# Patient Record
Sex: FEMALE | Race: WHITE | NOT HISPANIC OR LATINO | URBAN - METROPOLITAN AREA
[De-identification: names, ages, dates, MRNs, and addresses within clinical notes are randomized per-mention and may not be internally consistent; named-entity substitution may affect disease eponyms.]

---

## 2017-01-10 ENCOUNTER — OFFICE (AMBULATORY)
Dept: URBAN - METROPOLITAN AREA CLINIC 64 | Facility: CLINIC | Age: 52
End: 2017-01-10

## 2017-01-10 VITALS
HEART RATE: 69 BPM | WEIGHT: 217 LBS | HEIGHT: 64 IN | DIASTOLIC BLOOD PRESSURE: 69 MMHG | SYSTOLIC BLOOD PRESSURE: 116 MMHG

## 2017-01-10 DIAGNOSIS — R10.13 EPIGASTRIC PAIN: ICD-10-CM

## 2017-01-10 PROCEDURE — 99213 OFFICE O/P EST LOW 20 MIN: CPT

## 2017-01-10 RX ORDER — OMEPRAZOLE 40 MG/1
CAPSULE, DELAYED RELEASE PELLETS ORAL
Qty: 30 | Refills: 11 | Status: ACTIVE

## 2017-01-19 ENCOUNTER — ON CAMPUS - OUTPATIENT (AMBULATORY)
Dept: URBAN - METROPOLITAN AREA HOSPITAL 2 | Facility: HOSPITAL | Age: 52
End: 2017-01-19

## 2017-01-19 ENCOUNTER — OFFICE (AMBULATORY)
Dept: URBAN - METROPOLITAN AREA CLINIC 64 | Facility: CLINIC | Age: 52
End: 2017-01-19

## 2017-01-19 VITALS
DIASTOLIC BLOOD PRESSURE: 71 MMHG | HEART RATE: 69 BPM | OXYGEN SATURATION: 95 % | HEART RATE: 68 BPM | HEART RATE: 72 BPM | DIASTOLIC BLOOD PRESSURE: 53 MMHG | RESPIRATION RATE: 17 BRPM | RESPIRATION RATE: 15 BRPM | DIASTOLIC BLOOD PRESSURE: 64 MMHG | RESPIRATION RATE: 19 BRPM | OXYGEN SATURATION: 94 % | SYSTOLIC BLOOD PRESSURE: 106 MMHG | TEMPERATURE: 97.6 F | RESPIRATION RATE: 16 BRPM | OXYGEN SATURATION: 99 % | DIASTOLIC BLOOD PRESSURE: 72 MMHG | OXYGEN SATURATION: 96 % | DIASTOLIC BLOOD PRESSURE: 62 MMHG | DIASTOLIC BLOOD PRESSURE: 67 MMHG | HEART RATE: 70 BPM | SYSTOLIC BLOOD PRESSURE: 119 MMHG | WEIGHT: 209.6 LBS | SYSTOLIC BLOOD PRESSURE: 115 MMHG | SYSTOLIC BLOOD PRESSURE: 116 MMHG | HEIGHT: 64 IN | SYSTOLIC BLOOD PRESSURE: 102 MMHG | SYSTOLIC BLOOD PRESSURE: 101 MMHG | HEART RATE: 66 BPM

## 2017-01-19 DIAGNOSIS — K44.9 DIAPHRAGMATIC HERNIA WITHOUT OBSTRUCTION OR GANGRENE: ICD-10-CM

## 2017-01-19 DIAGNOSIS — R10.13 EPIGASTRIC PAIN: ICD-10-CM

## 2017-01-19 DIAGNOSIS — K29.70 GASTRITIS, UNSPECIFIED, WITHOUT BLEEDING: ICD-10-CM

## 2017-01-19 LAB
GI HISTOLOGY: A. SELECT: (no result)
GI HISTOLOGY: PDF REPORT: (no result)

## 2017-01-19 PROCEDURE — 88305 TISSUE EXAM BY PATHOLOGIST: CPT

## 2017-01-19 PROCEDURE — 43450 DILATE ESOPHAGUS 1/MULT PASS: CPT

## 2017-01-19 PROCEDURE — 43239 EGD BIOPSY SINGLE/MULTIPLE: CPT

## 2017-01-19 RX ADMIN — PROPOFOL: 10 INJECTION, EMULSION INTRAVENOUS at 07:31

## 2018-01-04 ENCOUNTER — OFFICE (AMBULATORY)
Dept: URBAN - METROPOLITAN AREA CLINIC 64 | Facility: CLINIC | Age: 53
End: 2018-01-04

## 2018-01-04 VITALS
HEIGHT: 64 IN | WEIGHT: 224 LBS | SYSTOLIC BLOOD PRESSURE: 132 MMHG | HEART RATE: 64 BPM | DIASTOLIC BLOOD PRESSURE: 77 MMHG

## 2018-01-04 DIAGNOSIS — R11.2 NAUSEA WITH VOMITING, UNSPECIFIED: ICD-10-CM

## 2018-01-04 DIAGNOSIS — R10.13 EPIGASTRIC PAIN: ICD-10-CM

## 2018-01-04 PROCEDURE — 99214 OFFICE O/P EST MOD 30 MIN: CPT

## 2018-12-04 ENCOUNTER — OFFICE (AMBULATORY)
Dept: URBAN - METROPOLITAN AREA PATHOLOGY 4 | Facility: PATHOLOGY | Age: 53
End: 2018-12-04
Payer: COMMERCIAL

## 2018-12-04 ENCOUNTER — HOSPITAL ENCOUNTER (OUTPATIENT)
Dept: OTHER | Facility: HOSPITAL | Age: 53
Setting detail: SPECIMEN
Discharge: HOME | End: 2018-12-04
Attending: INTERNAL MEDICINE | Admitting: INTERNAL MEDICINE

## 2018-12-04 ENCOUNTER — ON CAMPUS - OUTPATIENT (AMBULATORY)
Dept: URBAN - METROPOLITAN AREA HOSPITAL 2 | Facility: HOSPITAL | Age: 53
End: 2018-12-04
Payer: COMMERCIAL

## 2018-12-04 VITALS
DIASTOLIC BLOOD PRESSURE: 82 MMHG | OXYGEN SATURATION: 95 % | OXYGEN SATURATION: 99 % | SYSTOLIC BLOOD PRESSURE: 101 MMHG | DIASTOLIC BLOOD PRESSURE: 70 MMHG | DIASTOLIC BLOOD PRESSURE: 72 MMHG | HEART RATE: 60 BPM | RESPIRATION RATE: 18 BRPM | OXYGEN SATURATION: 96 % | DIASTOLIC BLOOD PRESSURE: 79 MMHG | HEART RATE: 63 BPM | DIASTOLIC BLOOD PRESSURE: 66 MMHG | DIASTOLIC BLOOD PRESSURE: 80 MMHG | SYSTOLIC BLOOD PRESSURE: 126 MMHG | WEIGHT: 224 LBS | HEIGHT: 64 IN | RESPIRATION RATE: 20 BRPM | HEART RATE: 67 BPM | SYSTOLIC BLOOD PRESSURE: 108 MMHG | HEART RATE: 62 BPM | OXYGEN SATURATION: 98 % | SYSTOLIC BLOOD PRESSURE: 128 MMHG | DIASTOLIC BLOOD PRESSURE: 63 MMHG | HEART RATE: 66 BPM | DIASTOLIC BLOOD PRESSURE: 102 MMHG | OXYGEN SATURATION: 97 % | DIASTOLIC BLOOD PRESSURE: 58 MMHG | SYSTOLIC BLOOD PRESSURE: 110 MMHG | SYSTOLIC BLOOD PRESSURE: 123 MMHG | HEART RATE: 61 BPM | RESPIRATION RATE: 16 BRPM | SYSTOLIC BLOOD PRESSURE: 102 MMHG | SYSTOLIC BLOOD PRESSURE: 109 MMHG | DIASTOLIC BLOOD PRESSURE: 71 MMHG | SYSTOLIC BLOOD PRESSURE: 100 MMHG | TEMPERATURE: 97.2 F | SYSTOLIC BLOOD PRESSURE: 150 MMHG

## 2018-12-04 DIAGNOSIS — K62.1 RECTAL POLYP: ICD-10-CM

## 2018-12-04 DIAGNOSIS — K63.5 POLYP OF COLON: ICD-10-CM

## 2018-12-04 DIAGNOSIS — Z86.010 PERSONAL HISTORY OF COLONIC POLYPS: ICD-10-CM

## 2018-12-04 DIAGNOSIS — D12.4 BENIGN NEOPLASM OF DESCENDING COLON: ICD-10-CM

## 2018-12-04 DIAGNOSIS — D12.5 BENIGN NEOPLASM OF SIGMOID COLON: ICD-10-CM

## 2018-12-04 DIAGNOSIS — D12.3 BENIGN NEOPLASM OF TRANSVERSE COLON: ICD-10-CM

## 2018-12-04 LAB
GI HISTOLOGY: A. UNSPECIFIED: (no result)
GI HISTOLOGY: B. UNSPECIFIED: (no result)
GI HISTOLOGY: C. UNSPECIFIED: (no result)
GI HISTOLOGY: D. UNSPECIFIED: (no result)
GI HISTOLOGY: PDF REPORT: (no result)

## 2018-12-04 PROCEDURE — 45385 COLONOSCOPY W/LESION REMOVAL: CPT | Mod: 33 | Performed by: INTERNAL MEDICINE

## 2018-12-04 PROCEDURE — 88305 TISSUE EXAM BY PATHOLOGIST: CPT | Mod: 26 | Performed by: INTERNAL MEDICINE

## 2020-10-19 ENCOUNTER — HOSPITAL ENCOUNTER (OUTPATIENT)
Dept: CARDIOLOGY | Facility: HOSPITAL | Age: 55
Discharge: HOME | End: 2020-10-19

## 2020-10-19 ENCOUNTER — TRANSCRIBE ORDERS (OUTPATIENT)
Dept: ADMINISTRATIVE | Facility: HOSPITAL | Age: 55
End: 2020-10-19

## 2020-10-19 ENCOUNTER — LAB (OUTPATIENT)
Dept: LAB | Facility: HOSPITAL | Age: 55
End: 2020-10-19

## 2020-10-19 DIAGNOSIS — M20.12 ACQUIRED HALLUX VALGUS OF LEFT FOOT: ICD-10-CM

## 2020-10-19 DIAGNOSIS — M20.12 ACQUIRED HALLUX VALGUS OF LEFT FOOT: Primary | ICD-10-CM

## 2020-10-19 LAB
ANION GAP SERPL CALCULATED.3IONS-SCNC: 10.6 MMOL/L (ref 5–15)
BASOPHILS # BLD AUTO: 0.07 10*3/MM3 (ref 0–0.2)
BASOPHILS NFR BLD AUTO: 0.7 % (ref 0–1.5)
BUN SERPL-MCNC: 10 MG/DL (ref 6–20)
BUN/CREAT SERPL: 18.2 (ref 7–25)
CALCIUM SPEC-SCNC: 9.9 MG/DL (ref 8.6–10.5)
CHLORIDE SERPL-SCNC: 101 MMOL/L (ref 98–107)
CO2 SERPL-SCNC: 27.4 MMOL/L (ref 22–29)
CREAT SERPL-MCNC: 0.55 MG/DL (ref 0.57–1)
DEPRECATED RDW RBC AUTO: 42.2 FL (ref 37–54)
EOSINOPHIL # BLD AUTO: 0.12 10*3/MM3 (ref 0–0.4)
EOSINOPHIL NFR BLD AUTO: 1.3 % (ref 0.3–6.2)
ERYTHROCYTE [DISTWIDTH] IN BLOOD BY AUTOMATED COUNT: 12.8 % (ref 12.3–15.4)
GFR SERPL CREATININE-BSD FRML MDRD: 116 ML/MIN/1.73
GLUCOSE SERPL-MCNC: 133 MG/DL (ref 65–99)
HCT VFR BLD AUTO: 46.5 % (ref 34–46.6)
HGB BLD-MCNC: 16.6 G/DL (ref 12–15.9)
IMM GRANULOCYTES # BLD AUTO: 0.02 10*3/MM3 (ref 0–0.05)
IMM GRANULOCYTES NFR BLD AUTO: 0.2 % (ref 0–0.5)
LYMPHOCYTES # BLD AUTO: 2.23 10*3/MM3 (ref 0.7–3.1)
LYMPHOCYTES NFR BLD AUTO: 23.4 % (ref 19.6–45.3)
MCH RBC QN AUTO: 32.5 PG (ref 26.6–33)
MCHC RBC AUTO-ENTMCNC: 35.7 G/DL (ref 31.5–35.7)
MCV RBC AUTO: 91 FL (ref 79–97)
MONOCYTES # BLD AUTO: 0.49 10*3/MM3 (ref 0.1–0.9)
MONOCYTES NFR BLD AUTO: 5.1 % (ref 5–12)
NEUTROPHILS NFR BLD AUTO: 6.62 10*3/MM3 (ref 1.7–7)
NEUTROPHILS NFR BLD AUTO: 69.3 % (ref 42.7–76)
NRBC BLD AUTO-RTO: 0 /100 WBC (ref 0–0.2)
PLATELET # BLD AUTO: 268 10*3/MM3 (ref 140–450)
PMV BLD AUTO: 10.2 FL (ref 6–12)
POTASSIUM SERPL-SCNC: 3.8 MMOL/L (ref 3.5–5.2)
RBC # BLD AUTO: 5.11 10*6/MM3 (ref 3.77–5.28)
SODIUM SERPL-SCNC: 139 MMOL/L (ref 136–145)
WBC # BLD AUTO: 9.55 10*3/MM3 (ref 3.4–10.8)

## 2020-10-19 PROCEDURE — 85025 COMPLETE CBC W/AUTO DIFF WBC: CPT

## 2020-10-19 PROCEDURE — 93005 ELECTROCARDIOGRAM TRACING: CPT | Performed by: PODIATRIST

## 2020-10-19 PROCEDURE — 80048 BASIC METABOLIC PNL TOTAL CA: CPT

## 2020-10-19 PROCEDURE — 93010 ELECTROCARDIOGRAM REPORT: CPT | Performed by: INTERNAL MEDICINE

## 2020-10-19 PROCEDURE — 36415 COLL VENOUS BLD VENIPUNCTURE: CPT

## 2021-03-07 ENCOUNTER — HOSPITAL ENCOUNTER (OUTPATIENT)
Facility: HOSPITAL | Age: 56
Setting detail: OBSERVATION
Discharge: HOME HEALTH | End: 2021-03-10
Attending: EMERGENCY MEDICINE | Admitting: ORTHOPAEDIC SURGERY

## 2021-03-07 ENCOUNTER — APPOINTMENT (OUTPATIENT)
Dept: GENERAL RADIOLOGY | Facility: HOSPITAL | Age: 56
End: 2021-03-07

## 2021-03-07 DIAGNOSIS — S42.212A: Primary | ICD-10-CM

## 2021-03-07 DIAGNOSIS — J44.9 CHRONIC OBSTRUCTIVE PULMONARY DISEASE, UNSPECIFIED COPD TYPE (HCC): ICD-10-CM
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
## 2021-03-07 PROBLEM — E78.5 HYPERLIPIDEMIA: Status: ACTIVE | Noted: 2021-03-07

## 2021-03-07 PROBLEM — D75.1 POLYCYTHEMIA: Chronic | Status: ACTIVE | Noted: 2021-03-07

## 2021-03-07 PROBLEM — G89.29 CHRONIC PAIN: Chronic | Status: ACTIVE | Noted: 2021-03-07

## 2021-03-07 PROBLEM — F31.9 BIPOLAR 1 DISORDER: Status: ACTIVE | Noted: 2021-03-07

## 2021-03-07 PROBLEM — J30.2 SEASONAL ALLERGIES: Chronic | Status: ACTIVE | Noted: 2021-03-07

## 2021-03-07 PROBLEM — E66.9 OBESITY (BMI 30-39.9): Chronic | Status: ACTIVE | Noted: 2021-03-07

## 2021-03-07 PROBLEM — F41.8 ANXIETY ASSOCIATED WITH DEPRESSION: Chronic | Status: ACTIVE | Noted: 2021-03-07

## 2021-03-07 PROBLEM — G47.00 INSOMNIA: Chronic | Status: ACTIVE | Noted: 2021-03-07

## 2021-03-07 LAB
ANION GAP SERPL CALCULATED.3IONS-SCNC: 18 MMOL/L (ref 5–15)
BASOPHILS # BLD AUTO: 0.1 10*3/MM3 (ref 0–0.2)
BASOPHILS NFR BLD AUTO: 1 % (ref 0–1.5)
BUN SERPL-MCNC: 11 MG/DL (ref 6–20)
BUN/CREAT SERPL: 21.6 (ref 7–25)
CALCIUM SPEC-SCNC: 9.1 MG/DL (ref 8.6–10.5)
CHLORIDE SERPL-SCNC: 97 MMOL/L (ref 98–107)
CO2 SERPL-SCNC: 23 MMOL/L (ref 22–29)
CREAT SERPL-MCNC: 0.51 MG/DL (ref 0.57–1)
DEPRECATED RDW RBC AUTO: 42.4 FL (ref 37–54)
EOSINOPHIL # BLD AUTO: 0.1 10*3/MM3 (ref 0–0.4)
EOSINOPHIL NFR BLD AUTO: 0.8 % (ref 0.3–6.2)
ERYTHROCYTE [DISTWIDTH] IN BLOOD BY AUTOMATED COUNT: 13.1 % (ref 12.3–15.4)
GFR SERPL CREATININE-BSD FRML MDRD: 126 ML/MIN/1.73
GLUCOSE SERPL-MCNC: 140 MG/DL (ref 65–99)
HCT VFR BLD AUTO: 45.1 % (ref 34–46.6)
HGB BLD-MCNC: 15.9 G/DL (ref 12–15.9)
LYMPHOCYTES # BLD AUTO: 2.2 10*3/MM3 (ref 0.7–3.1)
LYMPHOCYTES NFR BLD AUTO: 17.3 % (ref 19.6–45.3)
MAGNESIUM SERPL-MCNC: 1.7 MG/DL (ref 1.6–2.6)
MCH RBC QN AUTO: 32.7 PG (ref 26.6–33)
MCHC RBC AUTO-ENTMCNC: 35.2 G/DL (ref 31.5–35.7)
MCV RBC AUTO: 92.9 FL (ref 79–97)
MONOCYTES # BLD AUTO: 0.6 10*3/MM3 (ref 0.1–0.9)
MONOCYTES NFR BLD AUTO: 4.6 % (ref 5–12)
NEUTROPHILS NFR BLD AUTO: 76.3 % (ref 42.7–76)
NEUTROPHILS NFR BLD AUTO: 9.8 10*3/MM3 (ref 1.7–7)
NRBC BLD AUTO-RTO: 0.1 /100 WBC (ref 0–0.2)
PLATELET # BLD AUTO: 243 10*3/MM3 (ref 140–450)
PMV BLD AUTO: 7.8 FL (ref 6–12)
POTASSIUM SERPL-SCNC: 3.3 MMOL/L (ref 3.5–5.2)
RBC # BLD AUTO: 4.86 10*6/MM3 (ref 3.77–5.28)
SODIUM SERPL-SCNC: 138 MMOL/L (ref 136–145)
WBC # BLD AUTO: 12.9 10*3/MM3 (ref 3.4–10.8)

## 2021-03-07 PROCEDURE — 99219 PR INITIAL OBSERVATION CARE/DAY 50 MINUTES: CPT | Performed by: PHYSICIAN ASSISTANT

## 2021-03-07 PROCEDURE — 25010000002 ONDANSETRON PER 1 MG: Performed by: EMERGENCY MEDICINE

## 2021-03-07 PROCEDURE — 96375 TX/PRO/DX INJ NEW DRUG ADDON: CPT

## 2021-03-07 PROCEDURE — 25010000002 HYDROMORPHONE PER 4 MG: Performed by: EMERGENCY MEDICINE

## 2021-03-07 PROCEDURE — G0378 HOSPITAL OBSERVATION PER HR: HCPCS

## 2021-03-07 PROCEDURE — U0004 COV-19 TEST NON-CDC HGH THRU: HCPCS | Performed by: EMERGENCY MEDICINE

## 2021-03-07 PROCEDURE — C9803 HOPD COVID-19 SPEC COLLECT: HCPCS

## 2021-03-07 PROCEDURE — 96376 TX/PRO/DX INJ SAME DRUG ADON: CPT

## 2021-03-07 PROCEDURE — 83735 ASSAY OF MAGNESIUM: CPT | Performed by: PHYSICIAN ASSISTANT

## 2021-03-07 PROCEDURE — A4565 SLINGS: HCPCS

## 2021-03-07 PROCEDURE — 80048 BASIC METABOLIC PNL TOTAL CA: CPT | Performed by: PHYSICIAN ASSISTANT

## 2021-03-07 PROCEDURE — 85025 COMPLETE CBC W/AUTO DIFF WBC: CPT | Performed by: PHYSICIAN ASSISTANT

## 2021-03-07 PROCEDURE — 25010000002 MORPHINE PER 10 MG: Performed by: PHYSICIAN ASSISTANT

## 2021-03-07 PROCEDURE — 99285 EMERGENCY DEPT VISIT HI MDM: CPT

## 2021-03-07 PROCEDURE — 96374 THER/PROPH/DIAG INJ IV PUSH: CPT

## 2021-03-07 PROCEDURE — 73030 X-RAY EXAM OF SHOULDER: CPT

## 2021-03-07 RX ORDER — ACETAMINOPHEN 325 MG/1
650 TABLET ORAL EVERY 4 HOURS PRN
Status: DISCONTINUED | OUTPATIENT
Start: 2021-03-07 | End: 2021-03-10 | Stop reason: HOSPADM

## 2021-03-07 RX ORDER — SODIUM CHLORIDE, SODIUM LACTATE, POTASSIUM CHLORIDE, CALCIUM CHLORIDE 600; 310; 30; 20 MG/100ML; MG/100ML; MG/100ML; MG/100ML
100 INJECTION, SOLUTION INTRAVENOUS CONTINUOUS
Status: DISPENSED | OUTPATIENT
Start: 2021-03-07 | End: 2021-03-08

## 2021-03-07 RX ORDER — SODIUM CHLORIDE 0.9 % (FLUSH) 0.9 %
10 SYRINGE (ML) INJECTION AS NEEDED
Status: DISCONTINUED | OUTPATIENT
Start: 2021-03-07 | End: 2021-03-10 | Stop reason: HOSPADM

## 2021-03-07 RX ORDER — OMEPRAZOLE 40 MG/1
40 CAPSULE, DELAYED RELEASE ORAL DAILY
COMMUNITY

## 2021-03-07 RX ORDER — MAGNESIUM SULFATE HEPTAHYDRATE 40 MG/ML
4 INJECTION, SOLUTION INTRAVENOUS AS NEEDED
Status: DISCONTINUED | OUTPATIENT
Start: 2021-03-07 | End: 2021-03-10 | Stop reason: HOSPADM

## 2021-03-07 RX ORDER — SODIUM CHLORIDE 0.9 % (FLUSH) 0.9 %
10 SYRINGE (ML) INJECTION EVERY 12 HOURS SCHEDULED
Status: DISCONTINUED | OUTPATIENT
Start: 2021-03-07 | End: 2021-03-10 | Stop reason: HOSPADM

## 2021-03-07 RX ORDER — ATENOLOL AND CHLORTHALIDONE TABLET 100; 25 MG/1; MG/1
1 TABLET ORAL DAILY
COMMUNITY

## 2021-03-07 RX ORDER — CELECOXIB 200 MG/1
CAPSULE ORAL
COMMUNITY

## 2021-03-07 RX ORDER — TRAZODONE HYDROCHLORIDE 100 MG/1
100 TABLET ORAL
COMMUNITY

## 2021-03-07 RX ORDER — TRAZODONE HYDROCHLORIDE 100 MG/1
100 TABLET ORAL NIGHTLY PRN
Status: DISCONTINUED | OUTPATIENT
Start: 2021-03-07 | End: 2021-03-10 | Stop reason: HOSPADM

## 2021-03-07 RX ORDER — FUROSEMIDE 40 MG/1
40 TABLET ORAL DAILY
COMMUNITY

## 2021-03-07 RX ORDER — HYDROMORPHONE HCL 110MG/55ML
1 PATIENT CONTROLLED ANALGESIA SYRINGE INTRAVENOUS ONCE
Status: COMPLETED | OUTPATIENT
Start: 2021-03-07 | End: 2021-03-07

## 2021-03-07 RX ORDER — ATENOLOL AND CHLORTHALIDONE 100; 25 MG/1; MG/1
TABLET ORAL
Status: DISCONTINUED | OUTPATIENT
Start: 2021-03-08 | End: 2021-03-10 | Stop reason: HOSPADM

## 2021-03-07 RX ORDER — METOCLOPRAMIDE 10 MG/1
10 TABLET ORAL 2 TIMES DAILY
COMMUNITY

## 2021-03-07 RX ORDER — PANTOPRAZOLE SODIUM 40 MG/1
40 TABLET, DELAYED RELEASE ORAL EVERY MORNING
Status: DISCONTINUED | OUTPATIENT
Start: 2021-03-08 | End: 2021-03-10 | Stop reason: HOSPADM

## 2021-03-07 RX ORDER — ONDANSETRON 2 MG/ML
4 INJECTION INTRAMUSCULAR; INTRAVENOUS ONCE
Status: COMPLETED | OUTPATIENT
Start: 2021-03-07 | End: 2021-03-07

## 2021-03-07 RX ORDER — POTASSIUM CHLORIDE 1.5 G/1.77G
40 POWDER, FOR SOLUTION ORAL AS NEEDED
Status: DISCONTINUED | OUTPATIENT
Start: 2021-03-07 | End: 2021-03-10 | Stop reason: HOSPADM

## 2021-03-07 RX ORDER — MONTELUKAST SODIUM 10 MG/1
10 TABLET ORAL NIGHTLY
Status: DISCONTINUED | OUTPATIENT
Start: 2021-03-07 | End: 2021-03-10 | Stop reason: HOSPADM

## 2021-03-07 RX ORDER — MAGNESIUM SULFATE HEPTAHYDRATE 40 MG/ML
2 INJECTION, SOLUTION INTRAVENOUS AS NEEDED
Status: DISCONTINUED | OUTPATIENT
Start: 2021-03-07 | End: 2021-03-10 | Stop reason: HOSPADM

## 2021-03-07 RX ORDER — METOCLOPRAMIDE 10 MG/1
10 TABLET ORAL 2 TIMES DAILY
Status: DISCONTINUED | OUTPATIENT
Start: 2021-03-07 | End: 2021-03-10 | Stop reason: HOSPADM

## 2021-03-07 RX ORDER — LAMOTRIGINE 100 MG/1
100 TABLET ORAL
COMMUNITY

## 2021-03-07 RX ORDER — ONDANSETRON 2 MG/ML
4 INJECTION INTRAMUSCULAR; INTRAVENOUS EVERY 6 HOURS PRN
Status: DISCONTINUED | OUTPATIENT
Start: 2021-03-07 | End: 2021-03-10 | Stop reason: HOSPADM

## 2021-03-07 RX ORDER — CHOLECALCIFEROL (VITAMIN D3) 125 MCG
5 CAPSULE ORAL NIGHTLY PRN
Status: DISCONTINUED | OUTPATIENT
Start: 2021-03-07 | End: 2021-03-10 | Stop reason: HOSPADM

## 2021-03-07 RX ORDER — ONDANSETRON 4 MG/1
4 TABLET, FILM COATED ORAL EVERY 6 HOURS PRN
Status: DISCONTINUED | OUTPATIENT
Start: 2021-03-07 | End: 2021-03-10 | Stop reason: HOSPADM

## 2021-03-07 RX ORDER — ATORVASTATIN CALCIUM 40 MG/1
40 TABLET, FILM COATED ORAL DAILY
Status: DISCONTINUED | OUTPATIENT
Start: 2021-03-08 | End: 2021-03-10 | Stop reason: HOSPADM

## 2021-03-07 RX ORDER — MORPHINE SULFATE 4 MG/ML
2 INJECTION, SOLUTION INTRAMUSCULAR; INTRAVENOUS EVERY 4 HOURS PRN
Status: DISCONTINUED | OUTPATIENT
Start: 2021-03-07 | End: 2021-03-10 | Stop reason: HOSPADM

## 2021-03-07 RX ORDER — CALCIUM GLUCONATE 20 MG/ML
2 INJECTION, SOLUTION INTRAVENOUS AS NEEDED
Status: DISCONTINUED | OUTPATIENT
Start: 2021-03-07 | End: 2021-03-10 | Stop reason: HOSPADM

## 2021-03-07 RX ORDER — ATORVASTATIN CALCIUM 40 MG/1
40 TABLET, FILM COATED ORAL DAILY
COMMUNITY

## 2021-03-07 RX ORDER — GABAPENTIN 400 MG/1
400 CAPSULE ORAL 3 TIMES DAILY
COMMUNITY

## 2021-03-07 RX ORDER — GABAPENTIN 400 MG/1
400 CAPSULE ORAL 3 TIMES DAILY
Status: DISCONTINUED | OUTPATIENT
Start: 2021-03-07 | End: 2021-03-10 | Stop reason: HOSPADM

## 2021-03-07 RX ORDER — POTASSIUM CHLORIDE 20 MEQ/1
20 TABLET, EXTENDED RELEASE ORAL DAILY
COMMUNITY

## 2021-03-07 RX ORDER — ESCITALOPRAM OXALATE 10 MG/1
20 TABLET ORAL DAILY
Status: DISCONTINUED | OUTPATIENT
Start: 2021-03-08 | End: 2021-03-10 | Stop reason: HOSPADM

## 2021-03-07 RX ORDER — ESCITALOPRAM OXALATE 20 MG/1
20 TABLET ORAL DAILY
COMMUNITY

## 2021-03-07 RX ORDER — CALCIUM GLUCONATE 20 MG/ML
1 INJECTION, SOLUTION INTRAVENOUS AS NEEDED
Status: DISCONTINUED | OUTPATIENT
Start: 2021-03-07 | End: 2021-03-10 | Stop reason: HOSPADM

## 2021-03-07 RX ORDER — POTASSIUM CHLORIDE 20 MEQ/1
40 TABLET, EXTENDED RELEASE ORAL AS NEEDED
Status: DISCONTINUED | OUTPATIENT
Start: 2021-03-07 | End: 2021-03-10 | Stop reason: HOSPADM

## 2021-03-07 RX ORDER — ACETAMINOPHEN 160 MG/5ML
650 SOLUTION ORAL EVERY 4 HOURS PRN
Status: DISCONTINUED | OUTPATIENT
Start: 2021-03-07 | End: 2021-03-10 | Stop reason: HOSPADM

## 2021-03-07 RX ORDER — MONTELUKAST SODIUM 10 MG/1
10 TABLET ORAL NIGHTLY
COMMUNITY

## 2021-03-07 RX ORDER — ACETAMINOPHEN 650 MG/1
650 SUPPOSITORY RECTAL EVERY 4 HOURS PRN
Status: DISCONTINUED | OUTPATIENT
Start: 2021-03-07 | End: 2021-03-10 | Stop reason: HOSPADM

## 2021-03-07 RX ORDER — VALACYCLOVIR HYDROCHLORIDE 500 MG/1
500 TABLET, FILM COATED ORAL DAILY
Status: DISCONTINUED | OUTPATIENT
Start: 2021-03-08 | End: 2021-03-10 | Stop reason: HOSPADM

## 2021-03-07 RX ORDER — METHOCARBAMOL 500 MG/1
TABLET, FILM COATED ORAL
COMMUNITY
End: 2021-03-10 | Stop reason: HOSPADM

## 2021-03-07 RX ORDER — NICOTINE 21 MG/24HR
1 PATCH, TRANSDERMAL 24 HOURS TRANSDERMAL EVERY 24 HOURS
Status: DISCONTINUED | OUTPATIENT
Start: 2021-03-07 | End: 2021-03-10 | Stop reason: HOSPADM

## 2021-03-07 RX ORDER — VALACYCLOVIR HYDROCHLORIDE 500 MG/1
500 TABLET, FILM COATED ORAL DAILY
COMMUNITY

## 2021-03-07 RX ADMIN — MORPHINE SULFATE 2 MG: 4 INJECTION INTRAVENOUS at 21:16

## 2021-03-07 RX ADMIN — HYDROMORPHONE HYDROCHLORIDE 1 MG: 2 INJECTION, SOLUTION INTRAMUSCULAR; INTRAVENOUS; SUBCUTANEOUS at 18:19

## 2021-03-07 RX ADMIN — Medication 10 ML: at 19:17

## 2021-03-07 RX ADMIN — Medication 10 ML: at 21:17

## 2021-03-07 RX ADMIN — SODIUM CHLORIDE, POTASSIUM CHLORIDE, SODIUM LACTATE AND CALCIUM CHLORIDE 100 ML/HR: 600; 310; 30; 20 INJECTION, SOLUTION INTRAVENOUS at 21:17

## 2021-03-07 RX ADMIN — Medication 1 PATCH: at 21:21

## 2021-03-07 RX ADMIN — ONDANSETRON 4 MG: 2 INJECTION INTRAMUSCULAR; INTRAVENOUS at 18:19

## 2021-03-07 RX ADMIN — GABAPENTIN 400 MG: 400 CAPSULE ORAL at 21:16

## 2021-03-07 RX ADMIN — METOCLOPRAMIDE 10 MG: 10 TABLET ORAL at 21:16

## 2021-03-07 RX ADMIN — HYDROMORPHONE HYDROCHLORIDE 1 MG: 2 INJECTION, SOLUTION INTRAMUSCULAR; INTRAVENOUS; SUBCUTANEOUS at 19:16

## 2021-03-07 RX ADMIN — MONTELUKAST 10 MG: 10 TABLET, FILM COATED ORAL at 21:16

## 2021-03-07 NOTE — ED PROVIDER NOTES
"Subjective   Chief complaint: Left shoulder injury    55-year-old female presents with a left shoulder injury.  Patient apparently tripped over a dog leash and fell onto her left shoulder onto concrete.  She denies hitting her head or any loss of consciousness.  She reports severe pain in the left shoulder.  She denies any other injuries.  She is not on any blood thinners.  This occurred just prior to arrival.      History provided by:  Patient      Review of Systems   Constitutional: Negative for fever.   HENT: Negative for congestion.    Respiratory: Negative for cough and shortness of breath.    Cardiovascular: Negative for chest pain.   Gastrointestinal: Negative for abdominal pain.   Musculoskeletal:        Left shoulder pain   Skin: Negative for wound.   Neurological: Negative for dizziness and headaches.       No past medical history on file.    Allergies   Allergen Reactions   • Sulfa Antibiotics Anaphylaxis       No past surgical history on file.    No family history on file.    Social History     Socioeconomic History   • Marital status:      Spouse name: Not on file   • Number of children: Not on file   • Years of education: Not on file   • Highest education level: Not on file       /54   Pulse 68   Temp 97.4 °F (36.3 °C)   Resp 18   Ht 162.6 cm (64\")   Wt 93.4 kg (206 lb)   SpO2 95%   BMI 35.36 kg/m²       Objective   Physical Exam  Vitals and nursing note reviewed.   Constitutional:       Appearance: Normal appearance.   HENT:      Head: Normocephalic and atraumatic.      Mouth/Throat:      Mouth: Mucous membranes are moist.   Eyes:      Pupils: Pupils are equal, round, and reactive to light.   Cardiovascular:      Rate and Rhythm: Normal rate and regular rhythm.      Heart sounds: Normal heart sounds.   Pulmonary:      Effort: Pulmonary effort is normal. No respiratory distress.      Breath sounds: Normal breath sounds.   Abdominal:      Palpations: Abdomen is soft.      Tenderness: " There is no abdominal tenderness.   Musculoskeletal:      Cervical back: Normal range of motion and neck supple. No tenderness.      Comments: Tenderness diffusely around the left shoulder.  There is decreased range of motion due to pain.  Neurovascular intact distally.  Extremities otherwise negative with no other bony tenderness.   Skin:     General: Skin is warm and dry.   Neurological:      Mental Status: She is alert and oriented to person, place, and time.         Procedures           ED Course      XR Shoulder 2+ View Left    Result Date: 3/7/2021  Acute fracture of the left humeral neck.  Electronically Signed By-Alphonse Jasso MD On:3/7/2021 7:01 PM This report was finalized on [id number] by  Alphonse Jasso MD.                                         MDM   Patient was found to have a fracture of the left humeral neck.  Patient received 2 doses of Dilaudid in the emergency room and is still having a lot of pain.  She will be admitted for further pain control.  She was placed in an arm sling.  I discussed with the practitioner on-call for the hospitalist who agreed to admit.  Orthopedics will also be consulted.      Final diagnoses:   Fracture of neck of left humerus, closed, initial encounter   Fall, initial encounter            Israel Castellon MD  03/07/21 1930

## 2021-03-08 ENCOUNTER — APPOINTMENT (OUTPATIENT)
Dept: CT IMAGING | Facility: HOSPITAL | Age: 56
End: 2021-03-08

## 2021-03-08 ENCOUNTER — APPOINTMENT (OUTPATIENT)
Dept: GENERAL RADIOLOGY | Facility: HOSPITAL | Age: 56
End: 2021-03-08

## 2021-03-08 LAB
ABO GROUP BLD: NORMAL
ANION GAP SERPL CALCULATED.3IONS-SCNC: 11 MMOL/L (ref 5–15)
APTT PPP: 24.9 SECONDS (ref 24–31)
BASOPHILS # BLD AUTO: 0 10*3/MM3 (ref 0–0.2)
BASOPHILS NFR BLD AUTO: 0.3 % (ref 0–1.5)
BILIRUB UR QL STRIP: NEGATIVE
BLD GP AB SCN SERPL QL: NEGATIVE
BUN SERPL-MCNC: 12 MG/DL (ref 6–20)
BUN/CREAT SERPL: 18.8 (ref 7–25)
CALCIUM SPEC-SCNC: 8.9 MG/DL (ref 8.6–10.5)
CHLORIDE SERPL-SCNC: 99 MMOL/L (ref 98–107)
CLARITY UR: CLEAR
CO2 SERPL-SCNC: 29 MMOL/L (ref 22–29)
COLOR UR: YELLOW
CREAT SERPL-MCNC: 0.64 MG/DL (ref 0.57–1)
DEPRECATED RDW RBC AUTO: 42.4 FL (ref 37–54)
EOSINOPHIL # BLD AUTO: 0.1 10*3/MM3 (ref 0–0.4)
EOSINOPHIL NFR BLD AUTO: 1 % (ref 0.3–6.2)
ERYTHROCYTE [DISTWIDTH] IN BLOOD BY AUTOMATED COUNT: 13.1 % (ref 12.3–15.4)
GFR SERPL CREATININE-BSD FRML MDRD: 97 ML/MIN/1.73
GLUCOSE SERPL-MCNC: 141 MG/DL (ref 65–99)
GLUCOSE UR STRIP-MCNC: NEGATIVE MG/DL
HCT VFR BLD AUTO: 43.8 % (ref 34–46.6)
HGB BLD-MCNC: 15.2 G/DL (ref 12–15.9)
HGB UR QL STRIP.AUTO: NEGATIVE
INR PPP: 0.99 (ref 0.93–1.1)
KETONES UR QL STRIP: NEGATIVE
LEUKOCYTE ESTERASE UR QL STRIP.AUTO: NEGATIVE
LYMPHOCYTES # BLD AUTO: 2.2 10*3/MM3 (ref 0.7–3.1)
LYMPHOCYTES NFR BLD AUTO: 18.4 % (ref 19.6–45.3)
MAGNESIUM SERPL-MCNC: 1.8 MG/DL (ref 1.6–2.6)
MCH RBC QN AUTO: 32 PG (ref 26.6–33)
MCHC RBC AUTO-ENTMCNC: 34.6 G/DL (ref 31.5–35.7)
MCV RBC AUTO: 92.5 FL (ref 79–97)
MONOCYTES # BLD AUTO: 0.8 10*3/MM3 (ref 0.1–0.9)
MONOCYTES NFR BLD AUTO: 6.5 % (ref 5–12)
MRSA DNA SPEC QL NAA+PROBE: NORMAL
NEUTROPHILS NFR BLD AUTO: 73.8 % (ref 42.7–76)
NEUTROPHILS NFR BLD AUTO: 8.9 10*3/MM3 (ref 1.7–7)
NITRITE UR QL STRIP: NEGATIVE
NRBC BLD AUTO-RTO: 0 /100 WBC (ref 0–0.2)
PH UR STRIP.AUTO: 6 [PH] (ref 5–8)
PLATELET # BLD AUTO: 200 10*3/MM3 (ref 140–450)
PMV BLD AUTO: 7.7 FL (ref 6–12)
POTASSIUM SERPL-SCNC: 3.5 MMOL/L (ref 3.5–5.2)
PROT UR QL STRIP: NEGATIVE
PROTHROMBIN TIME: 10.9 SECONDS (ref 9.6–11.7)
RBC # BLD AUTO: 4.74 10*6/MM3 (ref 3.77–5.28)
RH BLD: POSITIVE
SARS-COV-2 ORF1AB RESP QL NAA+PROBE: NOT DETECTED
SODIUM SERPL-SCNC: 139 MMOL/L (ref 136–145)
SP GR UR STRIP: 1.02 (ref 1–1.03)
T&S EXPIRATION DATE: NORMAL
UROBILINOGEN UR QL STRIP: NORMAL
WBC # BLD AUTO: 12 10*3/MM3 (ref 3.4–10.8)

## 2021-03-08 PROCEDURE — 85730 THROMBOPLASTIN TIME PARTIAL: CPT | Performed by: INTERNAL MEDICINE

## 2021-03-08 PROCEDURE — 86900 BLOOD TYPING SEROLOGIC ABO: CPT

## 2021-03-08 PROCEDURE — G0378 HOSPITAL OBSERVATION PER HR: HCPCS

## 2021-03-08 PROCEDURE — 86900 BLOOD TYPING SEROLOGIC ABO: CPT | Performed by: INTERNAL MEDICINE

## 2021-03-08 PROCEDURE — 96376 TX/PRO/DX INJ SAME DRUG ADON: CPT

## 2021-03-08 PROCEDURE — 93005 ELECTROCARDIOGRAM TRACING: CPT | Performed by: INTERNAL MEDICINE

## 2021-03-08 PROCEDURE — 80048 BASIC METABOLIC PNL TOTAL CA: CPT | Performed by: PHYSICIAN ASSISTANT

## 2021-03-08 PROCEDURE — 87641 MR-STAPH DNA AMP PROBE: CPT | Performed by: INTERNAL MEDICINE

## 2021-03-08 PROCEDURE — 86850 RBC ANTIBODY SCREEN: CPT | Performed by: INTERNAL MEDICINE

## 2021-03-08 PROCEDURE — 71045 X-RAY EXAM CHEST 1 VIEW: CPT

## 2021-03-08 PROCEDURE — 85025 COMPLETE CBC W/AUTO DIFF WBC: CPT | Performed by: PHYSICIAN ASSISTANT

## 2021-03-08 PROCEDURE — 86901 BLOOD TYPING SEROLOGIC RH(D): CPT

## 2021-03-08 PROCEDURE — 99225 PR SBSQ OBSERVATION CARE/DAY 25 MINUTES: CPT | Performed by: INTERNAL MEDICINE

## 2021-03-08 PROCEDURE — 93010 ELECTROCARDIOGRAM REPORT: CPT | Performed by: INTERNAL MEDICINE

## 2021-03-08 PROCEDURE — 25010000002 MORPHINE PER 10 MG: Performed by: PHYSICIAN ASSISTANT

## 2021-03-08 PROCEDURE — 81003 URINALYSIS AUTO W/O SCOPE: CPT | Performed by: INTERNAL MEDICINE

## 2021-03-08 PROCEDURE — 86901 BLOOD TYPING SEROLOGIC RH(D): CPT | Performed by: INTERNAL MEDICINE

## 2021-03-08 PROCEDURE — 83735 ASSAY OF MAGNESIUM: CPT | Performed by: PHYSICIAN ASSISTANT

## 2021-03-08 PROCEDURE — 73200 CT UPPER EXTREMITY W/O DYE: CPT

## 2021-03-08 PROCEDURE — 85610 PROTHROMBIN TIME: CPT | Performed by: INTERNAL MEDICINE

## 2021-03-08 RX ORDER — OXYCODONE HYDROCHLORIDE 5 MG/1
10 TABLET ORAL EVERY 6 HOURS PRN
Status: DISCONTINUED | OUTPATIENT
Start: 2021-03-08 | End: 2021-03-10 | Stop reason: HOSPADM

## 2021-03-08 RX ADMIN — CHLORTHALIDONE: 25 TABLET ORAL at 10:23

## 2021-03-08 RX ADMIN — ATORVASTATIN CALCIUM 40 MG: 40 TABLET, FILM COATED ORAL at 10:24

## 2021-03-08 RX ADMIN — PANTOPRAZOLE SODIUM 40 MG: 40 TABLET, DELAYED RELEASE ORAL at 10:24

## 2021-03-08 RX ADMIN — VALACYCLOVIR HYDROCHLORIDE 500 MG: 500 TABLET ORAL at 10:23

## 2021-03-08 RX ADMIN — MORPHINE SULFATE 2 MG: 4 INJECTION INTRAVENOUS at 20:18

## 2021-03-08 RX ADMIN — KETAMINE HYDROCHLORIDE 28 MG: 50 INJECTION, SOLUTION INTRAMUSCULAR; INTRAVENOUS at 10:24

## 2021-03-08 RX ADMIN — OXYCODONE 10 MG: 5 TABLET ORAL at 11:37

## 2021-03-08 RX ADMIN — ESCITALOPRAM OXALATE 20 MG: 10 TABLET ORAL at 10:23

## 2021-03-08 RX ADMIN — GABAPENTIN 400 MG: 400 CAPSULE ORAL at 10:23

## 2021-03-08 RX ADMIN — Medication 10 ML: at 20:18

## 2021-03-08 RX ADMIN — MORPHINE SULFATE 2 MG: 4 INJECTION INTRAVENOUS at 05:11

## 2021-03-08 RX ADMIN — Medication 1 PATCH: at 20:20

## 2021-03-08 RX ADMIN — MONTELUKAST 10 MG: 10 TABLET, FILM COATED ORAL at 20:18

## 2021-03-08 RX ADMIN — METOCLOPRAMIDE 10 MG: 10 TABLET ORAL at 20:18

## 2021-03-08 RX ADMIN — OXYCODONE 10 MG: 5 TABLET ORAL at 17:45

## 2021-03-08 RX ADMIN — MORPHINE SULFATE 2 MG: 4 INJECTION INTRAVENOUS at 01:14

## 2021-03-08 RX ADMIN — Medication 10 ML: at 10:24

## 2021-03-08 RX ADMIN — METOCLOPRAMIDE 10 MG: 10 TABLET ORAL at 10:23

## 2021-03-08 RX ADMIN — GABAPENTIN 400 MG: 400 CAPSULE ORAL at 16:33

## 2021-03-08 RX ADMIN — GABAPENTIN 400 MG: 400 CAPSULE ORAL at 20:18

## 2021-03-08 RX ADMIN — MORPHINE SULFATE 2 MG: 4 INJECTION INTRAVENOUS at 08:32

## 2021-03-08 RX ADMIN — MORPHINE SULFATE 2 MG: 4 INJECTION INTRAVENOUS at 16:33

## 2021-03-08 NOTE — PLAN OF CARE
Goal Outcome Evaluation:  Plan of Care Reviewed With: patient  Progress: no change  Outcome Summary: Patient was admitted last night after falling over her dog at home, and sustaining a fracture to her left humerus. Patient has been experiencing moderate to severe pain. Morphine given IV, and ice pack applied to arm. The left arm was placed in a sling once the patient arrived on [facility]. Patient has been NPO since midnight. Ortho has been consulted. Patient has SANJAY and wears a CPAP at home. 2L O2 via N/C given while sleeping. VSS. Will continue to monitor.

## 2021-03-08 NOTE — PAYOR COMM NOTE
"  [facility]  NPI # [id number]  TID # [id number]      RETURN CONTACT  ERNESTINE VENCES RN [facility]   [facility] /    PH: [phone]  FX: [phone]    ===========================  ATTENTION RN REVIEW=====================    REFERENCE # PENDING-   ===========================  REQUEST FOR OBSERVATION-  AUTHORIZATION      ===========================  Please respond to above contact. Thank you.     ===========================    AbnerSom (55 y.o. Female)     Date of Birth Social Security Number Address Home Phone MRN    1965  [id number] [place] [facility] [phone] [id number]    Jew Marital Status          Rastafarian        Admission Date Admission Type Admitting Provider Attending Provider Department, Room/Bed    3/7/21 Emergency Dillon Barnett MD Gad, George Fayez Labib Youssief, MD [facility] SURGICAL INPATIENT, 4123/1    Discharge Date Discharge Disposition Discharge Destination                       Attending Provider: Dillon Barnett MD    Allergies: Sulfa Antibiotics    Isolation: None   Infection: None   Code Status: CPR    Ht: 162.6 cm (64\")   Wt: 93.4 kg (206 lb)    Admission Cmt: None   Principal Problem: Fracture of neck of left humerus, closed, initial encounter [S42.212A]                 Active Insurance as of 3/7/2021     Primary Coverage     Payor Plan Insurance Group Employer/Plan Group    [facility] MEDICARE REPLACEMENT [employer] OPTIONS MEDICARE REPLACEMENT [id number]     Payor Plan Address Payor Plan Phone Number Payor Plan Fax Number Effective Dates    [facility] [phone]  1/1/2020 - None Entered    [address]       [facility] [address]       Subscriber Name Subscriber Birth Date Member ID       SOM LEVINE 1965 [id number]                 Emergency Contacts      (Rel.) Home Phone Work Phone Mobile Phone    Alfredo Levine " (Spouse) -- -- [phone]        UR REVIEW    Continued review- obs appropriate  Verified obs orders- 3/7  1931     dx-  Fall with fracture of left humeral neck     consult-  ORTHO > pending CT UPPER EXTREM - possible surgery -  ===========================  Vss-   o2 2l nc    ===========================  [facility]- observation- >General Criteria: Observation Care (OC-022)  Observation care[A][B] is indicated for ALL of the following(1)(2)(3)(4)(5)(6)(7)(8)(9):  · Clinical care (eg, testing, monitoring, or treatment) needed beyond the usual emergency department time frame (eg, 3 to 4 hours)  · Clinical care needed is not appropriate for a lower level of care (ie, discharge to outpatient setting not appropriate).             History & Physical      Esteban London PA-C at 21 193     Attestation signed by Dillon Barnett MD at 21 1012    Attending Physician Attestation    I have reviewed the mid-level provider documentation, agree with the documentation, medical decision making and treatment plan as outlined by the mid-level provider.                         [facility] Medicine Services      Patient Name: Evangelina Levine  : 1965  MRN: [id number]  Primary Care Physician: Layla Stephens APRN  Date of admission: 3/7/2021    Patient Care Team:  Layla Stephens APRN as PCP - General (Nurse Practitioner)          Subjective   History Present Illness     Chief Complaint:   Chief Complaint   Patient presents with   • Arm Injury         Ms. Levine is a 55 y.o. female past medical history of seasonal allergies, polycythemia, obesity, hyperlipidemia, chronic pain, anxiety/depression and bipolar who presents to [facility] complaining of left shoulder pain.  Patient reports that on the afternoon of 3/7/2021 while taking her dog outside she was pulled off balance and fell off in approximately 3 foot retaining wall landing on her left side primarily her  left shoulder.  She denies any trauma to her head or loss of consciousness with these events.  When landing patient notes that she knew immediately she had fractured something in her left shoulder.  She states she began to have a sharp pain made worse with movement rated 10/10 at its worst and 2/10 at the time of my exam while lying still and following pain medication.  She notes that since that time she has also begun to experience a sensation of muscle cramping in her shoulder and across the left side of her clavicle.  She denies any other pain including chest pain, dyspnea, cough or fever, nausea or vomiting, changes in bowel or bladder habits, tachycardia or palpitations.  She reports that she smokes approximately 1 pack of cigarettes per day and drinks alcohol only socially.    In the ED x-ray of left shoulder showed an acute fracture of the left humeral neck    History of Present Illness    Review of Systems   Constitutional: Negative.   HENT: Negative.    Eyes: Negative.    Cardiovascular: Negative.    Respiratory: Negative.    Endocrine: Negative.    Skin: Negative.    Musculoskeletal: Positive for joint pain.   Gastrointestinal: Negative.    Neurological: Negative.    Psychiatric/Behavioral: Negative.            Personal History     Past Medical History: No past medical history on file.    Surgical History:    No past surgical history on file.        Family History: family history is not on file. Otherwise pertinent FHx was reviewed and unremarkable.     Social History:        Medications:  Prior to Admission medications    Not on File       Allergies:    Allergies   Allergen Reactions   • Sulfa Antibiotics Anaphylaxis       Objective   Objective     Vital Signs  Temp:  [97.4 °F (36.3 °C)] 97.4 °F (36.3 °C)  Heart Rate:  [67-70] 68  Resp:  [18] 18  BP: ()/(54-79) 108/54  SpO2:  [93 %-98 %] 95 %  on   ;      Body mass index is 35.36 kg/m².    Physical Exam  Vitals reviewed.   Constitutional:        General: She is not in acute distress.     Appearance: Normal appearance. She is obese. She is not ill-appearing or toxic-appearing.   HENT:      Head: Normocephalic and atraumatic.      Right Ear: External ear normal.      Left Ear: External ear normal.      Nose: Nose normal.      Mouth/Throat:      Mouth: Mucous membranes are moist.   Eyes:      Extraocular Movements: Extraocular movements intact.   Cardiovascular:      Rate and Rhythm: Normal rate and regular rhythm.      Pulses: Normal pulses.      Heart sounds: Normal heart sounds.   Pulmonary:      Effort: Pulmonary effort is normal.      Breath sounds: Normal breath sounds.   Abdominal:      General: Bowel sounds are normal. There is no distension.      Tenderness: There is no abdominal tenderness.   Musculoskeletal:      Cervical back: Normal range of motion.      Comments: Decreased range of motion secondary to pain in left shoulder   Skin:     General: Skin is warm and dry.   Neurological:      General: No focal deficit present.      Mental Status: She is alert and oriented to person, place, and time.      Comments: Neurovascular function distal to injury noted intact   Psychiatric:         Mood and Affect: Mood normal.         Behavior: Behavior normal.         Thought Content: Thought content normal.         Judgment: Judgment normal.           Results Review:  I have personally reviewed most recent radiology images and interpretations and agree with findings, most notably: X-ray left shoulder.              Invalid input(s):  ALKPHOS  CrCl cannot be calculated (Patient's most recent lab result is older than the maximum 30 days allowed.).  Brief Urine Lab Results     None          Microbiology Results (last 10 days)     ** No results found for the last 240 hours. **          ECG/EMG Results (most recent)     None                  XR Shoulder 2+ View Left    Result Date: 3/7/2021  Acute fracture of the left humeral neck.  Electronically Signed By-Alphonse  MD Louisa On:3/7/2021 7:01 PM This report was finalized on [id number] by  Alphonse Jasso MD.        CrCl cannot be calculated (Patient's most recent lab result is older than the maximum 30 days allowed.).    Assessment/Plan   Assessment/Plan       Active Hospital Problems    Diagnosis  POA   • **Fracture of neck of left humerus, closed, initial encounter [S42.212A]  Yes     Priority: High   • Anxiety associated with depression [F41.8]  Yes     Priority: Medium   • Polycythemia [D75.1]  Yes     Priority: Medium   • Obesity (BMI 30-39.9) [E66.9]  Yes     Priority: Low   • Bipolar 1 disorder (CMS/HCC) [F31.9]  Yes     Priority: Low   • Hyperlipidemia [E78.5]  Yes     Priority: Low   • Seasonal allergies [J30.2]  Yes     Priority: Low   • Chronic pain [G89.29]  Yes     Priority: Low   • Insomnia [G47.00]  Yes     Priority: Low      Resolved Hospital Problems   No resolved problems to display.     Fall with fracture of left humeral neck  -Patient presents with left shoulder pain following a fall approximately 3 feet on 3/7/2021  - x-ray of left shoulder showed an acute fracture of the left humeral neck  -Morphine and ketamine as needed for severe pain, may consider adding Toradol for pain following results of metabolic profile  -Check CBC and BMP  - mL per hour  -N.p.o. at midnight  -Orthopedic surgery consulted in ED    Hypertension  -Well controlled with a blood pressure on admission of 125/74  - Continue atenolol-chlorthalidone  - Monitor while admitted    Polycythemia  -Documented history of polycythemia from [facility], hemoglobin: 16.5  -Monitor CBC while admitted    Hyperlipidemia  -Continue statin    Depression/anxiety/bipolar/insomnia  -Continue trazodone, Lexapro and Ambien  -Restart Lamictal once dose verified    Chronic pain  -Continue hydrocodone, gabapentin     Seasonal allergies  -Zyrtec and Singulair    Obesity (BMI: 35.36)  -Encouraged on lifestyle modifications            VTE Prophylaxis  "-SCDs  Mechanical Order History:     None      Pharmalogical Order History:     None          CODE STATUS: Full  There are no questions and answers to display.       I discussed the patient's findings and my recommendations with patient.      Signature:Electronically signed by Esteban London PA-C, 03/07/21, 8:19 PM [facility]    [facility] Hospitalist Team          Electronically signed by Dillon Barnett MD at 03/08/21 1012          Emergency Department Notes      Israel Castellon MD at 03/07/21 1812          Subjective   Chief complaint: Left shoulder injury    55-year-old female presents with a left shoulder injury.  Patient apparently tripped over a dog leash and fell onto her left shoulder onto concrete.  She denies hitting her head or any loss of consciousness.  She reports severe pain in the left shoulder.  She denies any other injuries.  She is not on any blood thinners.  This occurred just prior to arrival.      History provided by:  Patient      Review of Systems   Constitutional: Negative for fever.   HENT: Negative for congestion.    Respiratory: Negative for cough and shortness of breath.    Cardiovascular: Negative for chest pain.   Gastrointestinal: Negative for abdominal pain.   Musculoskeletal:        Left shoulder pain   Skin: Negative for wound.   Neurological: Negative for dizziness and headaches.       No past medical history on file.    Allergies   Allergen Reactions   • Sulfa Antibiotics Anaphylaxis       No past surgical history on file.    No family history on file.    Social History     Socioeconomic History   • Marital status:      Spouse name: Not on file   • Number of children: Not on file   • Years of education: Not on file   • Highest education level: Not on file       /54   Pulse 68   Temp 97.4 °F (36.3 °C)   Resp 18   Ht 162.6 cm (64\")   Wt 93.4 kg (206 lb)   SpO2 95%   BMI 35.36 kg/m²       Objective   Physical Exam  Vitals and nursing note " reviewed.   Constitutional:       Appearance: Normal appearance.   HENT:      Head: Normocephalic and atraumatic.      Mouth/Throat:      Mouth: Mucous membranes are moist.   Eyes:      Pupils: Pupils are equal, round, and reactive to light.   Cardiovascular:      Rate and Rhythm: Normal rate and regular rhythm.      Heart sounds: Normal heart sounds.   Pulmonary:      Effort: Pulmonary effort is normal. No respiratory distress.      Breath sounds: Normal breath sounds.   Abdominal:      Palpations: Abdomen is soft.      Tenderness: There is no abdominal tenderness.   Musculoskeletal:      Cervical back: Normal range of motion and neck supple. No tenderness.      Comments: Tenderness diffusely around the left shoulder.  There is decreased range of motion due to pain.  Neurovascular intact distally.  Extremities otherwise negative with no other bony tenderness.   Skin:     General: Skin is warm and dry.   Neurological:      Mental Status: She is alert and oriented to person, place, and time.         Procedures          ED Course      XR Shoulder 2+ View Left    Result Date: 3/7/2021  Acute fracture of the left humeral neck.  Electronically Signed By-Alphonse Jasso MD On:3/7/2021 7:01 PM This report was finalized on [id number] by  Alphonse Jasso MD.                                         MDM   Patient was found to have a fracture of the left humeral neck.  Patient received 2 doses of Dilaudid in the emergency room and is still having a lot of pain.  She will be admitted for further pain control.  She was placed in an arm sling.  I discussed with the practitioner on-call for the hospitalist who agreed to admit.  Orthopedics will also be consulted.      Final diagnoses:   Fracture of neck of left humerus, closed, initial encounter   Fall, initial encounter            Israel Castellon MD  03/07/21 1930      Electronically signed by Israel Castellon MD at 03/07/21 1930       Operative/Procedure Notes (last 72 hours)  (Notes from 03/05/21 1445 through 03/08/21 1445)    No notes of this type exist for this encounter.            Physician Progress Notes (last 72 hours) (Notes from 03/05/21 1445 through 03/08/21 1445)      Dillon Barnett MD at 03/08/21 1012                [facility] Medicine Services Daily Progress Note      Hospitalist Team  LOS 0 days      Patient Care Team:  Layla Stephens APRN as PCP - General (Nurse Practitioner)    Patient Location: 4123/1      Subjective   Subjective     Chief Complaint / Subjective  Chief Complaint   Patient presents with   • Arm Injury         Brief Synopsis of Hospital Course/HPI  Ms. Levine is a 55 y.o. female past medical history of seasonal allergies, polycythemia, obesity, hyperlipidemia, chronic pain, anxiety/depression and bipolar who presents to [facility] complaining of left shoulder pain.  Patient reports that on the afternoon of 3/7/2021 while taking her dog outside she was pulled off balance and fell off in approximately 3 foot retaining wall landing on her left side primarily her left shoulder.  She denies any trauma to her head or loss of consciousness with these events.  When landing patient notes that she knew immediately she had fractured something in her left shoulder.  She states she began to have a sharp pain made worse with movement rated 10/10 at its worst and 2/10 at the time of my exam while lying still and following pain medication.  She notes that since that time she has also begun to experience a sensation of muscle cramping in her shoulder and across the left side of her clavicle.  She denies any other pain including chest pain, dyspnea, cough or fever, nausea or vomiting, changes in bowel or bladder habits, tachycardia or palpitations.  She reports that she smokes approximately 1 pack of cigarettes per day and drinks alcohol only socially.     In the ED x-ray of left shoulder showed an acute fracture of the  "left humeral neck         Date::    3/8  Co severe pain  had ct humerus  Had norco at home  Switch to percocet      Review of Systems   All other systems reviewed and are negative.        Objective   Objective      Vital Signs  Temp:  [97.4 °F (36.3 °C)-98.5 °F (36.9 °C)] 98.1 °F (36.7 °C)  Heart Rate:  [64-73] 73  Resp:  [14-20] 20  BP: ()/(54-79) 120/78  Oxygen Therapy  SpO2: 97 %  Pulse Oximetry Type: Continuous  Device (Oxygen Therapy): nasal cannula  Flow (L/min): 2  Flowsheet Rows      First Filed Value   Admission Height  162.6 cm (64\") Documented at 03/07/2021 1815   Admission Weight  93.4 kg (206 lb) Documented at 03/07/2021 1815        Intake & Output (last 3 days)       03/05 0701 - 03/06 0700 03/06 0701 - 03/07 0700 03/07 0701 - 03/08 0700 03/08 0701 - 03/09 0700    Urine (mL/kg/hr)   800 300 (1)    Total Output   800 300    Net   -800 -300                Lines, Drains & Airways    Active LDAs     Name:   Placement date:   Placement time:   Site:   Days:    Peripheral IV 03/07/21 1818 Right Antecubital   03/07/21 1818    Antecubital   less than 1                  Physical Exam:    Physical Exam  Vitals and nursing note reviewed.   Constitutional:       General: She is not in acute distress.     Appearance: Normal appearance. She is well-developed. She is not ill-appearing, toxic-appearing or diaphoretic.   HENT:      Head: Normocephalic and atraumatic.      Right Ear: Ear canal and external ear normal.      Left Ear: Ear canal and external ear normal.      Nose: Nose normal. No congestion or rhinorrhea.      Mouth/Throat:      Mouth: Mucous membranes are moist.      Pharynx: No oropharyngeal exudate.   Eyes:      General: No scleral icterus.        Right eye: No discharge.         Left eye: No discharge.      Extraocular Movements: Extraocular movements intact.      Conjunctiva/sclera: Conjunctivae normal.      Pupils: Pupils are equal, round, and reactive to light.   Neck:      Thyroid: No " thyromegaly.      Vascular: No carotid bruit or JVD.      Trachea: No tracheal deviation.   Cardiovascular:      Rate and Rhythm: Normal rate and regular rhythm.      Pulses: Normal pulses.      Heart sounds: Normal heart sounds. No murmur. No friction rub. No gallop.    Pulmonary:      Effort: Pulmonary effort is normal. No respiratory distress.      Breath sounds: Normal breath sounds. No stridor. No wheezing, rhonchi or rales.   Chest:      Chest wall: No tenderness.   Abdominal:      General: Bowel sounds are normal. There is no distension.      Palpations: Abdomen is soft. There is no mass.      Tenderness: There is no abdominal tenderness. There is no guarding or rebound.      Hernia: No hernia is present.   Musculoskeletal:         General: No swelling, tenderness, deformity or signs of injury. Normal range of motion.      Cervical back: Normal range of motion and neck supple. No rigidity. No muscular tenderness.      Right lower leg: No edema.      Left lower leg: No edema.   Lymphadenopathy:      Cervical: No cervical adenopathy.   Skin:     General: Skin is warm and dry.      Coloration: Skin is not jaundiced or pale.      Findings: No bruising, erythema or rash.   Neurological:      General: No focal deficit present.      Mental Status: She is alert and oriented to person, place, and time. Mental status is at baseline.      Cranial Nerves: No cranial nerve deficit.      Sensory: No sensory deficit.      Motor: No weakness or abnormal muscle tone.      Coordination: Coordination normal.   Psychiatric:         Mood and Affect: Mood normal.         Behavior: Behavior normal.         Thought Content: Thought content normal.         Judgment: Judgment normal.               Procedures:              Results Review:     I reviewed the patient's new clinical results.      Lab Results (last 24 hours)     Procedure Component Value Units Date/Time    CBC & Differential [[id number]]  (Abnormal) Collected: 03/08/21 7842     Specimen: Blood Updated: 03/08/21 0537    Narrative:      The following orders were created for panel order CBC & Differential.  Procedure                               Abnormality         Status                     ---------                               -----------         ------                     Scan Slide[[id number]]                                                                  CBC Auto Differential[[id number]]        Abnormal            Final result                 Please view results for these tests on the individual orders.    CBC Auto Differential [[id number]]  (Abnormal) Collected: 03/08/21 0526    Specimen: Blood Updated: 03/08/21 0537     WBC 12.00 10*3/mm3      RBC 4.74 10*6/mm3      Hemoglobin 15.2 g/dL      Hematocrit 43.8 %      MCV 92.5 fL      MCH 32.0 pg      MCHC 34.6 g/dL      RDW 13.1 %      RDW-SD 42.4 fl      MPV 7.7 fL      Platelets 200 10*3/mm3      Neutrophil % 73.8 %      Lymphocyte % 18.4 %      Monocyte % 6.5 %      Eosinophil % 1.0 %      Basophil % 0.3 %      Neutrophils, Absolute 8.90 10*3/mm3      Lymphocytes, Absolute 2.20 10*3/mm3      Monocytes, Absolute 0.80 10*3/mm3      Eosinophils, Absolute 0.10 10*3/mm3      Basophils, Absolute 0.00 10*3/mm3      nRBC 0.0 /100 WBC     Basic Metabolic Panel [[id number]]  (Abnormal) Collected: 03/08/21 0332    Specimen: Blood Updated: 03/08/21 0417     Glucose 141 mg/dL      BUN 12 mg/dL      Creatinine 0.64 mg/dL      Sodium 139 mmol/L      Potassium 3.5 mmol/L      Chloride 99 mmol/L      CO2 29.0 mmol/L      Calcium 8.9 mg/dL      eGFR Non African Amer 97 mL/min/1.73      BUN/Creatinine Ratio 18.8     Anion Gap 11.0 mmol/L     Narrative:      GFR Normal >60  Chronic Kidney Disease <60  Kidney Failure <15      Magnesium [[id number]]  (Normal) Collected: 03/08/21 0332    Specimen: Blood Updated: 03/08/21 0417     Magnesium 1.8 mg/dL     Urinalysis With Microscopic If Indicated (No Culture) - Urine, Clean Catch [[id number]]  (Normal)  Collected: 03/08/21 0404    Specimen: Urine, Clean Catch Updated: 03/08/21 0413     Color, UA Yellow     Appearance, UA Clear     pH, UA 6.0     Specific Gravity, UA 1.016     Glucose, UA Negative     Ketones, UA Negative     Bilirubin, UA Negative     Blood, UA Negative     Protein, UA Negative     Leuk Esterase, UA Negative     Nitrite, UA Negative     Urobilinogen, UA 0.2 E.U./dL    Narrative:      Urine microscopic not indicated.    aPTT [[id number]]  (Normal) Collected: 03/08/21 0332    Specimen: Blood Updated: 03/08/21 0403     PTT 24.9 seconds     Protime-INR [[id number]]  (Normal) Collected: 03/08/21 0332    Specimen: Blood Updated: 03/08/21 0403     Protime 10.9 Seconds      INR 0.99    MRSA Screen, PCR (Inpatient) - Swab, Nares [[id number]]  (Normal) Collected: 03/08/21 0201    Specimen: Swab from Nares Updated: 03/08/21 0321     MRSA PCR No MRSA Detected    Basic Metabolic Panel [[id number]]  (Abnormal) Collected: 03/07/21 2058    Specimen: Blood Updated: 03/07/21 2132     Glucose 140 mg/dL      BUN 11 mg/dL      Creatinine 0.51 mg/dL      Sodium 138 mmol/L      Potassium 3.3 mmol/L      Comment: Slight hemolysis detected by analyzer. Results may be affected.        Chloride 97 mmol/L      CO2 23.0 mmol/L      Calcium 9.1 mg/dL      eGFR Non African Amer 126 mL/min/1.73      BUN/Creatinine Ratio 21.6     Anion Gap 18.0 mmol/L     Narrative:      GFR Normal >60  Chronic Kidney Disease <60  Kidney Failure <15      Magnesium [[id number]]  (Normal) Collected: 03/07/21 2058    Specimen: Blood Updated: 03/07/21 2132     Magnesium 1.7 mg/dL     CBC & Differential [[id number]]  (Abnormal) Collected: 03/07/21 2058    Specimen: Blood Updated: 03/07/21 2105    Narrative:      The following orders were created for panel order CBC & Differential.  Procedure                               Abnormality         Status                     ---------                               -----------         ------                     CBC  Auto Differential[[id number]]        Abnormal            Final result                 Please view results for these tests on the individual orders.    CBC Auto Differential [[id number]]  (Abnormal) Collected: 03/07/21 2058    Specimen: Blood Updated: 03/07/21 2105     WBC 12.90 10*3/mm3      RBC 4.86 10*6/mm3      Hemoglobin 15.9 g/dL      Hematocrit 45.1 %      MCV 92.9 fL      MCH 32.7 pg      MCHC 35.2 g/dL      RDW 13.1 %      RDW-SD 42.4 fl      MPV 7.8 fL      Platelets 243 10*3/mm3      Neutrophil % 76.3 %      Lymphocyte % 17.3 %      Monocyte % 4.6 %      Eosinophil % 0.8 %      Basophil % 1.0 %      Neutrophils, Absolute 9.80 10*3/mm3      Lymphocytes, Absolute 2.20 10*3/mm3      Monocytes, Absolute 0.60 10*3/mm3      Eosinophils, Absolute 0.10 10*3/mm3      Basophils, Absolute 0.10 10*3/mm3      nRBC 0.1 /100 WBC     COVID PRE-OP / PRE-PROCEDURE SCREENING ORDER (NO ISOLATION) - Swab, Nasopharynx [[id number]] Collected: 03/07/21 1946    Specimen: Swab from Nasopharynx Updated: 03/07/21 1948    Narrative:      The following orders were created for panel order COVID PRE-OP / PRE-PROCEDURE SCREENING ORDER (NO ISOLATION) - Swab, Nasopharynx.  Procedure                               Abnormality         Status                     ---------                               -----------         ------                     COVID-19,APTIMA PANTHER,...[[id number]]                      In process                   Please view results for these tests on the individual orders.    COVID-19,APTIMA PANTHER,[facility], NP/OP SWAB IN UTM/VTM/SALINE TRANSPORT MEDIA,24 HR TAT - Swab, Nasopharynx [[id number]] Collected: 03/07/21 1946    Specimen: Swab from Nasopharynx Updated: 03/07/21 1948        No results found for: HGBA1C  Results from last 7 days   Lab Units 03/08/21  0332   INR  0.99           No results found for: LIPASE  Lab Results   Component Value Date    CHLPL 169 10/28/2019    TRIG 271 (H) 10/28/2019    HDL 36 (L)  10/28/2019    LDL 79 10/28/2019       No results found for: INTRAOP, PREDX, FINALDX, COMDX    Microbiology Results (last 10 days)     Procedure Component Value - Date/Time    MRSA Screen, PCR (Inpatient) - Swab, Nares [[id number]]  (Normal) Collected: 03/08/21 0201    Lab Status: Final result Specimen: Swab from Nares Updated: 03/08/21 0321     MRSA PCR No MRSA Detected          ECG/EMG Results (most recent)     Procedure Component Value Units Date/Time    ECG 12 Lead [[id number]] Collected: 03/08/21 0544     Updated: 03/08/21 0546     QT Interval 413 ms     Narrative:      HEART RATE= 68  bpm  RR Interval= 880  ms  [place] Interval= 132  ms  P Horizontal Axis= 23  deg  P Front Axis= 47  deg  QRSD Interval= 87  ms  QT Interval= 413  ms  QRS Axis= 62  deg  T Wave Axis= 23  deg  - NORMAL ECG -  Sinus rhythm  When compared with ECG of 19-Oct-2020 12:56:45,  Significant axis, voltage or hypertrophy change  Electronically Signed By:   Date and Time of Study: 2021-03-08 05:44:02                  XR Shoulder 2+ View Left    Result Date: 3/7/2021  Acute fracture of the left humeral neck.  Electronically Signed By-Alphonse Jasso MD On:3/7/2021 7:01 PM This report was finalized on [id number] by  Alphonse Jasso MD.    XR Chest 1 View    Result Date: 3/8/2021  Mild bibasilar subsegmental atelectasis.  Electronically Signed By-Mita Graham MD On:3/8/2021 8:06 AM This report was finalized on [id number] by  Mita Graham MD.          Xrays, labs reviewed personally by physician.    Medication Review:   I have reviewed the patient's current medication list      Scheduled Meds  atenolol-chlorthalidone (TENORETIC) 100-25 combo dose, , Oral, Q24H  atorvastatin, 40 mg, Oral, Daily  escitalopram, 20 mg, Oral, Daily  gabapentin, 400 mg, Oral, TID  metoclopramide, 10 mg, Oral, BID  montelukast, 10 mg, Oral, Nightly  nicotine, 1 patch, Transdermal, Q24H  pantoprazole, 40 mg, Oral, QAM  sodium chloride, 10 mL, Intravenous,  Q12H  valACYclovir, 500 mg, Oral, Daily        Meds Infusions       Meds PRN  •  acetaminophen **OR** acetaminophen **OR** acetaminophen  •  Calcium Gluconate-NaCl **AND** calcium gluconate **AND** Calcium, Ionized  •  ketamine (KETALAR) IVPB **AND** Ketamine Vital Signs & Assessment  •  magnesium sulfate **OR** magnesium sulfate **OR** magnesium sulfate  •  melatonin  •  Morphine  •  ondansetron **OR** ondansetron  •  potassium & sodium phosphates **OR** potassium & sodium phosphates  •  potassium chloride  •  potassium chloride  •  [COMPLETED] Insert peripheral IV **AND** sodium chloride  •  sodium chloride  •  traZODone        Assessment/Plan   Assessment/Plan     Active Hospital Problems    Diagnosis  POA   • **Fracture of neck of left humerus, closed, initial encounter [S42.736A]  Yes   • Obesity (BMI 30-39.9) [E66.9]  Yes   • Anxiety associated with depression [F41.8]  Yes   • Bipolar 1 disorder (CMS/HCC) [F31.9]  Yes   • Polycythemia [D75.1]  Yes   • Hyperlipidemia [E78.5]  Yes   • Seasonal allergies [J30.2]  Yes   • Chronic pain [G89.29]  Yes   • Insomnia [G47.00]  Yes      Resolved Hospital Problems   No resolved problems to display.       MEDICAL DECISION MAKING COMPLEXITY BY PROBLEM:   Fall with fracture of left humeral neck  -Patient presents with left shoulder pain following a fall approximately 3 feet on 3/7/2021  - x-ray of left shoulder showed an acute fracture of the left humeral neck  -Morphine and ketamine as needed for severe pain, may consider adding Toradol for pain following results of metabolic profile  -Check CBC and BMP  - mL per hour  -N.p.o. at midnight  -Orthopedic surgery consulted in ED     Hypertension  -Well controlled with a blood pressure on admission of 125/74  - Continue atenolol-chlorthalidone  - Monitor while admitted     Polycythemia  -Documented history of polycythemia from [facility], hemoglobin: 16.5  -Monitor CBC while admitted     Hyperlipidemia  -Continue  statin     Depression/anxiety/bipolar/insomnia  -Continue trazodone, Lexapro and Ambien  -Restart Lamictal once dose verified     Chronic pain  -Continue hydrocodone, gabapentin      Seasonal allergies  -Zyrtec and Singulair     Obesity (BMI: 35.36)  -Encouraged on lifestyle modifications                   VTE Prophylaxis -   Mechanical Order History:      Ordered        03/07/21 2036  Place Sequential Compression Device  Once         03/07/21 2036  Maintain Sequential Compression Device  Continuous                 Pharmalogical Order History:     None            Code Status -   Code Status and Medical Interventions:   Ordered at: 03/07/21 2008     Code Status:    CPR     Medical Interventions (Level of Support Prior to Arrest):    Full       This patient has been examined wearing appropriate Personal Protective Equipment and discussed with hospital infection control department. 03/08/21        Discharge Planning  home        Electronically signed by Dillon Barnett MD, 03/08/21, 10:13 [facility].  [facility] Hospitalist Team        Electronically signed by Dillon Barnett MD at 03/08/21 1021          Consult Notes (last 72 hours) (Notes from 03/05/21 1445 through 03/08/21 1445)      Marques Renae MD at 03/08/21 0833              Referring Provider: Dillon Barnett MD  Reason for Consultation: Left shoulder pain following a fall    Patient Care Team:  Layla Stephens APRN as PCP - General (Nurse Practitioner)    Chief complaint left shoulder pain following a fall when she tripped on the dog [employer]    Subjective .     History of present illness: Present illness started yesterday when she tripped over a dog leash and fell and hurt her left shoulder.  She says the left shoulder hurts so much that she was in severe pain unable to move the shoulder.  Came to the emergency room where she was evaluated and diagnosed with a undisplaced proximal humerus fracture was put in an arm sling and  admitted for pain control and referred to me for further evaluation and treatment      History  History reviewed. No pertinent past medical history.    History reviewed. No pertinent surgical history.    History reviewed. No pertinent family history.    Social History     Tobacco Use   • Smoking status: Current Every Day Smoker     Packs/day: 1.00     Years: 35.00     Pack years: 35.00     Types: Cigarettes     Start date: 3/7/1986   • Smokeless tobacco: Never Used   Substance Use Topics   • Alcohol use: Not on file   • Drug use: Not on file       Medications Prior to Admission   Medication Sig Dispense Refill Last Dose   • atenolol-chlorthalidone (TENORETIC) 100-25 MG per tablet Take 1 tablet by mouth Daily.      • atorvastatin (LIPITOR) 40 MG tablet Take 40 mg by mouth Daily.      • celecoxib (CeleBREX) 200 MG capsule Take  by mouth.      • escitalopram (LEXAPRO) 20 MG tablet Take 20 mg by mouth Daily.      • furosemide (LASIX) 40 MG tablet Take 40 mg by mouth Daily.      • gabapentin (NEURONTIN) 400 MG capsule Take 400 mg by mouth 3 (Three) Times a Day.      • lamoTRIgine (LaMICtal) 100 MG tablet Take 100 mg by mouth.      • methocarbamol (ROBAXIN) 500 MG tablet Take  by mouth.      • metoclopramide (REGLAN) 10 MG tablet Take 10 mg by mouth 2 (two) times a day.      • montelukast (SINGULAIR) 10 MG tablet Take 10 mg by mouth Every Night.      • omeprazole (priLOSEC) 40 MG capsule Take 40 mg by mouth Daily.      • potassium chloride (K-DUR,KLOR-CON) 20 MEQ CR tablet Take 20 mEq by mouth Daily.      • traZODone (DESYREL) 100 MG tablet Take 100 mg by mouth.      • valACYclovir (VALTREX) 500 MG tablet Take 500 mg by mouth Daily.           Scheduled Meds:    atenolol-chlorthalidone (TENORETIC) 100-25 combo dose, , Oral, Q24H  atorvastatin, 40 mg, Oral, Daily  escitalopram, 20 mg, Oral, Daily  gabapentin, 400 mg, Oral, TID  metoclopramide, 10 mg, Oral, BID  montelukast, 10 mg, Oral, Nightly  nicotine, 1 patch,  Transdermal, Q24H  pantoprazole, 40 mg, Oral, QAM  sodium chloride, 10 mL, Intravenous, Q12H  valACYclovir, 500 mg, Oral, Daily         Continuous Infusions:          PRN Meds:   •  acetaminophen **OR** acetaminophen **OR** acetaminophen  •  Calcium Gluconate-NaCl **AND** calcium gluconate **AND** Calcium, Ionized  •  ketamine (KETALAR) IVPB **AND** Ketamine Vital Signs & Assessment  •  magnesium sulfate **OR** magnesium sulfate **OR** magnesium sulfate  •  melatonin  •  Morphine  •  ondansetron **OR** ondansetron  •  potassium & sodium phosphates **OR** potassium & sodium phosphates  •  potassium chloride  •  potassium chloride  •  [COMPLETED] Insert peripheral IV **AND** sodium chloride  •  sodium chloride  •  traZODone      Allergies:    Sulfa antibiotics        Objective     Vital Signs   Temp:  [97.4 °F (36.3 °C)-98.5 °F (36.9 °C)] 98.1 °F (36.7 °C)  Heart Rate:  [64-73] 73  Resp:  [14-20] 20  BP: ()/(54-79) 120/78    Physical Exam:    General Appearance:    Alert, cooperative, in no acute distress   Head:    Normocephalic, without obvious abnormality, atraumatic   Eyes:            Lids and lashes normal, conjunctivae and sclerae normal, no   icterus, no pallor, corneas clear, PERRLA   Ears:    Ears appear intact with no abnormalities noted   Throat:   No oral lesions, no thrush, oral mucosa moist   Neck:   No adenopathy, supple, trachea midline, no thyromegaly, no   carotid bruit, no JVD   Back:     No kyphosis present, no scoliosis present, no skin lesions,      erythema or scars, no tenderness to percussion or                   palpation,   range of motion normal   Lungs:     Clear to auscultation,respirations regular, even and                  unlabored    Heart:    Regular rhythm and normal rate, normal S1 and S2, no            murmur, no gallop, no rub, no click   Chest Wall:    No abnormalities observed   Abdomen:     Normal bowel sounds, no masses, no organomegaly, soft        non-tender,  non-distended, no guarding, no rebound                tenderness   Rectal:     Deferred   Extremities:  Left upper extremity is in a arm sling.  She is able to move the fingers.  Sensation of the hand for radial median ulnar nerves intact.  Movements of the shoulder are very painful.  There is diffuse swelling of the left shoulder.  She is tender to palpate all around the shoulder region.   Pulses:  Radial pulses felt normal.   Skin:   No bleeding, bruising or rash   Lymph nodes:   No palpable adenopathy   Neurologic:  Sensation of the hand for radial median ulnar nerves are normal.   strength is normal.                            Results Review     Lab Results (most recent)     Procedure Component Value Units Date/Time    CBC & Differential [[id number]]  (Abnormal) Collected: 03/08/21 0331    Specimen: Blood Updated: 03/08/21 0537    Narrative:      The following orders were created for panel order CBC & Differential.  Procedure                               Abnormality         Status                     ---------                               -----------         ------                     Scan Slide[[id number]]                                                                  CBC Auto Differential[[id number]]        Abnormal            Final result                 Please view results for these tests on the individual orders.    CBC Auto Differential [[id number]]  (Abnormal) Collected: 03/08/21 0526    Specimen: Blood Updated: 03/08/21 0537     WBC 12.00 10*3/mm3      RBC 4.74 10*6/mm3      Hemoglobin 15.2 g/dL      Hematocrit 43.8 %      MCV 92.5 fL      MCH 32.0 pg      MCHC 34.6 g/dL      RDW 13.1 %      RDW-SD 42.4 fl      MPV 7.7 fL      Platelets 200 10*3/mm3      Neutrophil % 73.8 %      Lymphocyte % 18.4 %      Monocyte % 6.5 %      Eosinophil % 1.0 %      Basophil % 0.3 %      Neutrophils, Absolute 8.90 10*3/mm3      Lymphocytes, Absolute 2.20 10*3/mm3      Monocytes, Absolute 0.80 10*3/mm3       Eosinophils, Absolute 0.10 10*3/mm3      Basophils, Absolute 0.00 10*3/mm3      nRBC 0.0 /100 WBC     Basic Metabolic Panel [[id number]]  (Abnormal) Collected: 03/08/21 0332    Specimen: Blood Updated: 03/08/21 0417     Glucose 141 mg/dL      BUN 12 mg/dL      Creatinine 0.64 mg/dL      Sodium 139 mmol/L      Potassium 3.5 mmol/L      Chloride 99 mmol/L      CO2 29.0 mmol/L      Calcium 8.9 mg/dL      eGFR Non African Amer 97 mL/min/1.73      BUN/Creatinine Ratio 18.8     Anion Gap 11.0 mmol/L     Narrative:      GFR Normal >60  Chronic Kidney Disease <60  Kidney Failure <15      Magnesium [[id number]]  (Normal) Collected: 03/08/21 0332    Specimen: Blood Updated: 03/08/21 0417     Magnesium 1.8 mg/dL     Urinalysis With Microscopic If Indicated (No Culture) - Urine, Clean Catch [[id number]]  (Normal) Collected: 03/08/21 0404    Specimen: Urine, Clean Catch Updated: 03/08/21 0413     Color, UA Yellow     Appearance, UA Clear     pH, UA 6.0     Specific Gravity, UA 1.016     Glucose, UA Negative     Ketones, UA Negative     Bilirubin, UA Negative     Blood, UA Negative     Protein, UA Negative     Leuk Esterase, UA Negative     Nitrite, UA Negative     Urobilinogen, UA 0.2 E.U./dL    Narrative:      Urine microscopic not indicated.    aPTT [[id number]]  (Normal) Collected: 03/08/21 0332    Specimen: Blood Updated: 03/08/21 0403     PTT 24.9 seconds     Protime-INR [[id number]]  (Normal) Collected: 03/08/21 0332    Specimen: Blood Updated: 03/08/21 0403     Protime 10.9 Seconds      INR 0.99    MRSA Screen, PCR (Inpatient) - Swab, Nares [[id number]]  (Normal) Collected: 03/08/21 0201    Specimen: Swab from Nares Updated: 03/08/21 0321     MRSA PCR No MRSA Detected    Basic Metabolic Panel [[id number]]  (Abnormal) Collected: 03/07/21 2058    Specimen: Blood Updated: 03/07/21 2132     Glucose 140 mg/dL      BUN 11 mg/dL      Creatinine 0.51 mg/dL      Sodium 138 mmol/L      Potassium 3.3 mmol/L      Comment: Slight hemolysis  detected by analyzer. Results may be affected.        Chloride 97 mmol/L      CO2 23.0 mmol/L      Calcium 9.1 mg/dL      eGFR Non African Amer 126 mL/min/1.73      BUN/Creatinine Ratio 21.6     Anion Gap 18.0 mmol/L     Narrative:      GFR Normal >60  Chronic Kidney Disease <60  Kidney Failure <15      Magnesium [[id number]]  (Normal) Collected: 03/07/21 2058    Specimen: Blood Updated: 03/07/21 2132     Magnesium 1.7 mg/dL     CBC & Differential [[id number]]  (Abnormal) Collected: 03/07/21 2058    Specimen: Blood Updated: 03/07/21 2105    Narrative:      The following orders were created for panel order CBC & Differential.  Procedure                               Abnormality         Status                     ---------                               -----------         ------                     CBC Auto Differential[[id number]]        Abnormal            Final result                 Please view results for these tests on the individual orders.    CBC Auto Differential [[id number]]  (Abnormal) Collected: 03/07/21 2058    Specimen: Blood Updated: 03/07/21 2105     WBC 12.90 10*3/mm3      RBC 4.86 10*6/mm3      Hemoglobin 15.9 g/dL      Hematocrit 45.1 %      MCV 92.9 fL      MCH 32.7 pg      MCHC 35.2 g/dL      RDW 13.1 %      RDW-SD 42.4 fl      MPV 7.8 fL      Platelets 243 10*3/mm3      Neutrophil % 76.3 %      Lymphocyte % 17.3 %      Monocyte % 4.6 %      Eosinophil % 0.8 %      Basophil % 1.0 %      Neutrophils, Absolute 9.80 10*3/mm3      Lymphocytes, Absolute 2.20 10*3/mm3      Monocytes, Absolute 0.60 10*3/mm3      Eosinophils, Absolute 0.10 10*3/mm3      Basophils, Absolute 0.10 10*3/mm3      nRBC 0.1 /100 WBC     COVID PRE-OP / PRE-PROCEDURE SCREENING ORDER (NO ISOLATION) - Swab, Nasopharynx [[id number]] Collected: 03/07/21 1946    Specimen: Swab from Nasopharynx Updated: 03/07/21 1948    Narrative:      The following orders were created for panel order COVID PRE-OP / PRE-PROCEDURE SCREENING ORDER (NO  ISOLATION) - Swab, Nasopharynx.  Procedure                               Abnormality         Status                     ---------                               -----------         ------                     COVID-19,APTIMA PANTHER,...[[id number]]                      In process                   Please view results for these tests on the individual orders.    COVID-19,APTIMA PANTHER,[facility], NP/OP SWAB IN UTM/VTM/SALINE TRANSPORT MEDIA,24 HR TAT - Swab, Nasopharynx [[id number]] Collected: 03/07/21 1946    Specimen: Swab from Nasopharynx Updated: 03/07/21 1948           Imaging Results (Most Recent)     Procedure Component Value Units Date/Time    XR Chest 1 View [[id number]] Collected: 03/08/21 0805     Updated: 03/08/21 0808    Narrative:      DATE OF EXAM:  3/8/2021 6:11 AM     PROCEDURE:  XR CHEST 1 VW-     INDICATIONS:  Preoperative evaluation. Left humeral fracture.       COMPARISON:  PA and lateral chest 07/27/2015     TECHNIQUE:   Single radiographic view of the chest was obtained.     FINDINGS:  The study is performed with diminished volume inspiration. Suspected  mild subsegmental atelectasis in the lung bases. No dense lung  consolidations are identified. Heart size is borderline enlarged, with  the silhouette likely accentuated by the portable technique. Pulmonary  vascular distribution is normal. No pleural effusion or pneumothorax is  identified. There are surgical changes within the cervical spine.       Impression:      Mild bibasilar subsegmental atelectasis.     Electronically Signed By-Mita Graham MD On:3/8/2021 8:06 AM  This report was finalized on [id number] by  Mita Graham MD.    XR Shoulder 2+ View Left [[id number]] Collected: 03/07/21 1901     Updated: 03/07/21 1903    Narrative:      Examination: XR SHOULDER 2+ VW LEFT-     Date of Exam: 3/7/2021 6:46 PM     Indication: fall.     Comparison: None available.     Technique: 3 radiographic views of the left shoulder were obtained.      Findings:  There is an acute mildly comminuted mildly displaced left humeral neck  fracture. No dislocation. There is mild acromioclavicular osteophyte  formation. There is maintenance of the acromiohumeral and  coracoclavicular distances.       Impression:      Acute fracture of the left humeral neck.     Electronically Signed By-Alphonse Jasso MD On:3/7/2021 7:01 PM  This report was finalized on [id number] by  Alphonse Jasso MD.             I reviewed the patient's new clinical results.  I reviewed the patient's new imaging results and agree with the interpretation.      Assessment/Plan       Fracture of neck of left humerus, closed, initial encounter    Obesity (BMI 30-39.9)    Anxiety associated with depression    Bipolar 1 disorder (CMS/HCC)    Polycythemia    Hyperlipidemia    Seasonal allergies    Chronic pain    Insomnia      Fracture neck of humerus left in good alignment.  Plan is to obtain a CT scan for further evaluation and possible surgical intervention.    I discussed the patient's findings and my recommendations with patient.  We will follow her back up with a CT scan.    Marques Renae MD  03/08/21  08:33 EST      Electronically signed by Marques Renae MD at 03/08/21 4307

## 2021-03-08 NOTE — H&P
[facility] Medicine Services      Patient Name: Evangelina Levine  : 1965  MRN: [id number]  Primary Care Physician: Layla Stephens APRN  Date of admission: 3/7/2021    Patient Care Team:  Layla Stephens APRN as PCP - General (Nurse Practitioner)          Subjective   History Present Illness     Chief Complaint:   Chief Complaint   Patient presents with   • Arm Injury         Ms. Levine is a 55 y.o. female past medical history of seasonal allergies, polycythemia, obesity, hyperlipidemia, chronic pain, anxiety/depression and bipolar who presents to [facility] complaining of left shoulder pain.  Patient reports that on the afternoon of 3/7/2021 while taking her dog outside she was pulled off balance and fell off in approximately 3 foot retaining wall landing on her left side primarily her left shoulder.  She denies any trauma to her head or loss of consciousness with these events.  When landing patient notes that she knew immediately she had fractured something in her left shoulder.  She states she began to have a sharp pain made worse with movement rated 10/10 at its worst and 2/10 at the time of my exam while lying still and following pain medication.  She notes that since that time she has also begun to experience a sensation of muscle cramping in her shoulder and across the left side of her clavicle.  She denies any other pain including chest pain, dyspnea, cough or fever, nausea or vomiting, changes in bowel or bladder habits, tachycardia or palpitations.  She reports that she smokes approximately 1 pack of cigarettes per day and drinks alcohol only socially.    In the ED x-ray of left shoulder showed an acute fracture of the left humeral neck    History of Present Illness    Review of Systems   Constitutional: Negative.   HENT: Negative.    Eyes: Negative.    Cardiovascular: Negative.    Respiratory: Negative.    Endocrine: Negative.    Skin: Negative.     Musculoskeletal: Positive for joint pain.   Gastrointestinal: Negative.    Neurological: Negative.    Psychiatric/Behavioral: Negative.            Personal History     Past Medical History: No past medical history on file.    Surgical History:    No past surgical history on file.        Family History: family history is not on file. Otherwise pertinent FHx was reviewed and unremarkable.     Social History:        Medications:  Prior to Admission medications    Not on File       Allergies:    Allergies   Allergen Reactions   • Sulfa Antibiotics Anaphylaxis       Objective   Objective     Vital Signs  Temp:  [97.4 °F (36.3 °C)] 97.4 °F (36.3 °C)  Heart Rate:  [67-70] 68  Resp:  [18] 18  BP: ()/(54-79) 108/54  SpO2:  [93 %-98 %] 95 %  on   ;      Body mass index is 35.36 kg/m².    Physical Exam  Vitals reviewed.   Constitutional:       General: She is not in acute distress.     Appearance: Normal appearance. She is obese. She is not ill-appearing or toxic-appearing.   HENT:      Head: Normocephalic and atraumatic.      Right Ear: External ear normal.      Left Ear: External ear normal.      Nose: Nose normal.      Mouth/Throat:      Mouth: Mucous membranes are moist.   Eyes:      Extraocular Movements: Extraocular movements intact.   Cardiovascular:      Rate and Rhythm: Normal rate and regular rhythm.      Pulses: Normal pulses.      Heart sounds: Normal heart sounds.   Pulmonary:      Effort: Pulmonary effort is normal.      Breath sounds: Normal breath sounds.   Abdominal:      General: Bowel sounds are normal. There is no distension.      Tenderness: There is no abdominal tenderness.   Musculoskeletal:      Cervical back: Normal range of motion.      Comments: Decreased range of motion secondary to pain in left shoulder   Skin:     General: Skin is warm and dry.   Neurological:      General: No focal deficit present.      Mental Status: She is alert and oriented to person, place, and time.      Comments:  Neurovascular function distal to injury noted intact   Psychiatric:         Mood and Affect: Mood normal.         Behavior: Behavior normal.         Thought Content: Thought content normal.         Judgment: Judgment normal.           Results Review:  I have personally reviewed most recent radiology images and interpretations and agree with findings, most notably: X-ray left shoulder.              Invalid input(s):  ALKPHOS  CrCl cannot be calculated (Patient's most recent lab result is older than the maximum 30 days allowed.).  Brief Urine Lab Results     None          Microbiology Results (last 10 days)     ** No results found for the last 240 hours. **          ECG/EMG Results (most recent)     None                  XR Shoulder 2+ View Left    Result Date: 3/7/2021  Acute fracture of the left humeral neck.  Electronically Signed By-Alphonse Jasso MD On:3/7/2021 7:01 PM This report was finalized on [id number] by  Alphonse Jasso MD.        CrCl cannot be calculated (Patient's most recent lab result is older than the maximum 30 days allowed.).    Assessment/Plan   Assessment/Plan       Active Hospital Problems    Diagnosis  POA   • **Fracture of neck of left humerus, closed, initial encounter [S42.212A]  Yes     Priority: High   • Anxiety associated with depression [F41.8]  Yes     Priority: Medium   • Polycythemia [D75.1]  Yes     Priority: Medium   • Obesity (BMI 30-39.9) [E66.9]  Yes     Priority: Low   • Bipolar 1 disorder (CMS/HCC) [F31.9]  Yes     Priority: Low   • Hyperlipidemia [E78.5]  Yes     Priority: Low   • Seasonal allergies [J30.2]  Yes     Priority: Low   • Chronic pain [G89.29]  Yes     Priority: Low   • Insomnia [G47.00]  Yes     Priority: Low      Resolved Hospital Problems   No resolved problems to display.     Fall with fracture of left humeral neck  -Patient presents with left shoulder pain following a fall approximately 3 feet on 3/7/2021  - x-ray of left shoulder showed an acute fracture  of the left humeral neck  -Morphine and ketamine as needed for severe pain, may consider adding Toradol for pain following results of metabolic profile  -Check CBC and BMP  - mL per hour  -N.p.o. at midnight  -Orthopedic surgery consulted in ED    Hypertension  -Well controlled with a blood pressure on admission of 125/74  - Continue atenolol-chlorthalidone  - Monitor while admitted    Polycythemia  -Documented history of polycythemia from [facility], hemoglobin: 16.5  -Monitor CBC while admitted    Hyperlipidemia  -Continue statin    Depression/anxiety/bipolar/insomnia  -Continue trazodone, Lexapro and Ambien  -Restart Lamictal once dose verified    Chronic pain  -Continue hydrocodone, gabapentin     Seasonal allergies  -Zyrtec and Singulair    Obesity (BMI: 35.36)  -Encouraged on lifestyle modifications            VTE Prophylaxis -SCDs  Mechanical Order History:     None      Pharmalogical Order History:     None          CODE STATUS: Full  There are no questions and answers to display.       I discussed the patient's findings and my recommendations with patient.      Signature:Electronically signed by Esteban London PA-C, 03/07/21, 8:19 PM EST.    [facility] Hospitalist Team

## 2021-03-08 NOTE — CONSULTS
Referring Provider: Dillon Barnett MD  Reason for Consultation: Left shoulder pain following a fall    Patient Care Team:  Layla Stephens APRN as PCP - General (Nurse Practitioner)    Chief complaint left shoulder pain following a fall when she tripped on the dog leash    Subjective .     History of present illness: Present illness started yesterday when she tripped over a dog leash and fell and hurt her left shoulder.  She says the left shoulder hurts so much that she was in severe pain unable to move the shoulder.  Came to the emergency room where she was evaluated and diagnosed with a undisplaced proximal humerus fracture was put in an arm sling and admitted for pain control and referred to me for further evaluation and treatment      History  History reviewed. No pertinent past medical history.    History reviewed. No pertinent surgical history.    History reviewed. No pertinent family history.    Social History     Tobacco Use   • Smoking status: Current Every Day Smoker     Packs/day: 1.00     Years: 35.00     Pack years: 35.00     Types: Cigarettes     Start date: 3/7/1986   • Smokeless tobacco: Never Used   Substance Use Topics   • Alcohol use: Not on file   • Drug use: Not on file       Medications Prior to Admission   Medication Sig Dispense Refill Last Dose   • atenolol-chlorthalidone (TENORETIC) 100-25 MG per tablet Take 1 tablet by mouth Daily.      • atorvastatin (LIPITOR) 40 MG tablet Take 40 mg by mouth Daily.      • celecoxib (CeleBREX) 200 MG capsule Take  by mouth.      • escitalopram (LEXAPRO) 20 MG tablet Take 20 mg by mouth Daily.      • furosemide (LASIX) 40 MG tablet Take 40 mg by mouth Daily.      • gabapentin (NEURONTIN) 400 MG capsule Take 400 mg by mouth 3 (Three) Times a Day.      • lamoTRIgine (LaMICtal) 100 MG tablet Take 100 mg by mouth.      • methocarbamol (ROBAXIN) 500 MG tablet Take  by mouth.      • metoclopramide (REGLAN) 10 MG tablet Take 10 mg by mouth  2 (two) times a day.      • montelukast (SINGULAIR) 10 MG tablet Take 10 mg by mouth Every Night.      • omeprazole (priLOSEC) 40 MG capsule Take 40 mg by mouth Daily.      • potassium chloride (K-DUR,KLOR-CON) 20 MEQ CR tablet Take 20 mEq by mouth Daily.      • traZODone (DESYREL) 100 MG tablet Take 100 mg by mouth.      • valACYclovir (VALTREX) 500 MG tablet Take 500 mg by mouth Daily.           Scheduled Meds:    atenolol-chlorthalidone (TENORETIC) 100-25 combo dose, , Oral, Q24H  atorvastatin, 40 mg, Oral, Daily  escitalopram, 20 mg, Oral, Daily  gabapentin, 400 mg, Oral, TID  metoclopramide, 10 mg, Oral, BID  montelukast, 10 mg, Oral, Nightly  nicotine, 1 patch, Transdermal, Q24H  pantoprazole, 40 mg, Oral, QAM  sodium chloride, 10 mL, Intravenous, Q12H  valACYclovir, 500 mg, Oral, Daily         Continuous Infusions:          PRN Meds:   •  acetaminophen **OR** acetaminophen **OR** acetaminophen  •  Calcium Gluconate-NaCl **AND** calcium gluconate **AND** Calcium, Ionized  •  ketamine (KETALAR) IVPB **AND** Ketamine Vital Signs & Assessment  •  magnesium sulfate **OR** magnesium sulfate **OR** magnesium sulfate  •  melatonin  •  Morphine  •  ondansetron **OR** ondansetron  •  potassium & sodium phosphates **OR** potassium & sodium phosphates  •  potassium chloride  •  potassium chloride  •  [COMPLETED] Insert peripheral IV **AND** sodium chloride  •  sodium chloride  •  traZODone      Allergies:    Sulfa antibiotics        Objective     Vital Signs   Temp:  [97.4 °F (36.3 °C)-98.5 °F (36.9 °C)] 98.1 °F (36.7 °C)  Heart Rate:  [64-73] 73  Resp:  [14-20] 20  BP: ()/(54-79) 120/78    Physical Exam:    General Appearance:    Alert, cooperative, in no acute distress   Head:    Normocephalic, without obvious abnormality, atraumatic   Eyes:            Lids and lashes normal, conjunctivae and sclerae normal, no   icterus, no pallor, corneas clear, PERRLA   Ears:    Ears appear intact with no abnormalities noted    Throat:   No oral lesions, no thrush, oral mucosa moist   Neck:   No adenopathy, supple, trachea midline, no thyromegaly, no   carotid bruit, no JVD   Back:     No kyphosis present, no scoliosis present, no skin lesions,      erythema or scars, no tenderness to percussion or                   palpation,   range of motion normal   Lungs:     Clear to auscultation,respirations regular, even and                  unlabored    Heart:    Regular rhythm and normal rate, normal S1 and S2, no            murmur, no gallop, no rub, no click   Chest Wall:    No abnormalities observed   Abdomen:     Normal bowel sounds, no masses, no organomegaly, soft        non-tender, non-distended, no guarding, no rebound                tenderness   Rectal:     Deferred   Extremities:  Left upper extremity is in a arm sling.  She is able to move the fingers.  Sensation of the hand for radial median ulnar nerves intact.  Movements of the shoulder are very painful.  There is diffuse swelling of the left shoulder.  She is tender to palpate all around the shoulder region.   Pulses:  Radial pulses felt normal.   Skin:   No bleeding, bruising or rash   Lymph nodes:   No palpable adenopathy   Neurologic:  Sensation of the hand for radial median ulnar nerves are normal.   strength is normal.                            Results Review     Lab Results (most recent)     Procedure Component Value Units Date/Time    CBC & Differential [[id number]]  (Abnormal) Collected: 03/08/21 0331    Specimen: Blood Updated: 03/08/21 0537    Narrative:      The following orders were created for panel order CBC & Differential.  Procedure                               Abnormality         Status                     ---------                               -----------         ------                     Scan Slide[[id number]]                                                                  CBC Auto Differential[[id number]]        Abnormal            Final result                  Please view results for these tests on the individual orders.    CBC Auto Differential [[id number]]  (Abnormal) Collected: 03/08/21 0526    Specimen: Blood Updated: 03/08/21 0537     WBC 12.00 10*3/mm3      RBC 4.74 10*6/mm3      Hemoglobin 15.2 g/dL      Hematocrit 43.8 %      MCV 92.5 fL      MCH 32.0 pg      MCHC 34.6 g/dL      RDW 13.1 %      RDW-SD 42.4 fl      MPV 7.7 fL      Platelets 200 10*3/mm3      Neutrophil % 73.8 %      Lymphocyte % 18.4 %      Monocyte % 6.5 %      Eosinophil % 1.0 %      Basophil % 0.3 %      Neutrophils, Absolute 8.90 10*3/mm3      Lymphocytes, Absolute 2.20 10*3/mm3      Monocytes, Absolute 0.80 10*3/mm3      Eosinophils, Absolute 0.10 10*3/mm3      Basophils, Absolute 0.00 10*3/mm3      nRBC 0.0 /100 WBC     Basic Metabolic Panel [[id number]]  (Abnormal) Collected: 03/08/21 0332    Specimen: Blood Updated: 03/08/21 0417     Glucose 141 mg/dL      BUN 12 mg/dL      Creatinine 0.64 mg/dL      Sodium 139 mmol/L      Potassium 3.5 mmol/L      Chloride 99 mmol/L      CO2 29.0 mmol/L      Calcium 8.9 mg/dL      eGFR Non African Amer 97 mL/min/1.73      BUN/Creatinine Ratio 18.8     Anion Gap 11.0 mmol/L     Narrative:      GFR Normal >60  Chronic Kidney Disease <60  Kidney Failure <15      Magnesium [[id number]]  (Normal) Collected: 03/08/21 0332    Specimen: Blood Updated: 03/08/21 0417     Magnesium 1.8 mg/dL     Urinalysis With Microscopic If Indicated (No Culture) - Urine, Clean Catch [[id number]]  (Normal) Collected: 03/08/21 0404    Specimen: Urine, Clean Catch Updated: 03/08/21 0413     Color, UA Yellow     Appearance, UA Clear     pH, UA 6.0     Specific Gravity, UA 1.016     Glucose, UA Negative     Ketones, UA Negative     Bilirubin, UA Negative     Blood, UA Negative     Protein, UA Negative     Leuk Esterase, UA Negative     Nitrite, UA Negative     Urobilinogen, UA 0.2 E.U./dL    Narrative:      Urine microscopic not indicated.    aPTT [[id number]]  (Normal) Collected:  03/08/21 0332    Specimen: Blood Updated: 03/08/21 0403     PTT 24.9 seconds     Protime-INR [[id number]]  (Normal) Collected: 03/08/21 0332    Specimen: Blood Updated: 03/08/21 0403     Protime 10.9 Seconds      INR 0.99    MRSA Screen, PCR (Inpatient) - Swab, Nares [[id number]]  (Normal) Collected: 03/08/21 0201    Specimen: Swab from Nares Updated: 03/08/21 0321     MRSA PCR No MRSA Detected    Basic Metabolic Panel [[id number]]  (Abnormal) Collected: 03/07/21 2058    Specimen: Blood Updated: 03/07/21 2132     Glucose 140 mg/dL      BUN 11 mg/dL      Creatinine 0.51 mg/dL      Sodium 138 mmol/L      Potassium 3.3 mmol/L      Comment: Slight hemolysis detected by analyzer. Results may be affected.        Chloride 97 mmol/L      CO2 23.0 mmol/L      Calcium 9.1 mg/dL      eGFR Non African Amer 126 mL/min/1.73      BUN/Creatinine Ratio 21.6     Anion Gap 18.0 mmol/L     Narrative:      GFR Normal >60  Chronic Kidney Disease <60  Kidney Failure <15      Magnesium [[id number]]  (Normal) Collected: 03/07/21 2058    Specimen: Blood Updated: 03/07/21 2132     Magnesium 1.7 mg/dL     CBC & Differential [[id number]]  (Abnormal) Collected: 03/07/21 2058    Specimen: Blood Updated: 03/07/21 2105    Narrative:      The following orders were created for panel order CBC & Differential.  Procedure                               Abnormality         Status                     ---------                               -----------         ------                     CBC Auto Differential[[id number]]        Abnormal            Final result                 Please view results for these tests on the individual orders.    CBC Auto Differential [[id number]]  (Abnormal) Collected: 03/07/21 2058    Specimen: Blood Updated: 03/07/21 2105     WBC 12.90 10*3/mm3      RBC 4.86 10*6/mm3      Hemoglobin 15.9 g/dL      Hematocrit 45.1 %      MCV 92.9 fL      MCH 32.7 pg      MCHC 35.2 g/dL      RDW 13.1 %      RDW-SD 42.4 fl      MPV 7.8 fL      Platelets  243 10*3/mm3      Neutrophil % 76.3 %      Lymphocyte % 17.3 %      Monocyte % 4.6 %      Eosinophil % 0.8 %      Basophil % 1.0 %      Neutrophils, Absolute 9.80 10*3/mm3      Lymphocytes, Absolute 2.20 10*3/mm3      Monocytes, Absolute 0.60 10*3/mm3      Eosinophils, Absolute 0.10 10*3/mm3      Basophils, Absolute 0.10 10*3/mm3      nRBC 0.1 /100 WBC     COVID PRE-OP / PRE-PROCEDURE SCREENING ORDER (NO ISOLATION) - Swab, Nasopharynx [[id number]] Collected: 03/07/21 1946    Specimen: Swab from Nasopharynx Updated: 03/07/21 1948    Narrative:      The following orders were created for panel order COVID PRE-OP / PRE-PROCEDURE SCREENING ORDER (NO ISOLATION) - Swab, Nasopharynx.  Procedure                               Abnormality         Status                     ---------                               -----------         ------                     COVID-19,APTIMA PANTHER,...[[id number]]                      In process                   Please view results for these tests on the individual orders.    COVID-19,APTIMA PANTHER,[facility] IN-HOUSE, NP/OP SWAB IN UTM/VTM/SALINE TRANSPORT MEDIA,24 HR TAT - Swab, Nasopharynx [[id number]] Collected: 03/07/21 1946    Specimen: Swab from Nasopharynx Updated: 03/07/21 1948           Imaging Results (Most Recent)     Procedure Component Value Units Date/Time    XR Chest 1 View [[id number]] Collected: 03/08/21 0805     Updated: 03/08/21 0808    Narrative:      DATE OF EXAM:  3/8/2021 6:11 AM     PROCEDURE:  XR CHEST 1 VW-     INDICATIONS:  Preoperative evaluation. Left humeral fracture.       COMPARISON:  PA and lateral chest 07/27/2015     TECHNIQUE:   Single radiographic view of the chest was obtained.     FINDINGS:  The study is performed with diminished volume inspiration. Suspected  mild subsegmental atelectasis in the lung bases. No dense lung  consolidations are identified. Heart size is borderline enlarged, with  the silhouette likely accentuated by the portable technique.  Pulmonary  vascular distribution is normal. No pleural effusion or pneumothorax is  identified. There are surgical changes within the cervical spine.       Impression:      Mild bibasilar subsegmental atelectasis.     Electronically Signed By-Mita Graham MD On:3/8/2021 8:06 AM  This report was finalized on [id number] by  Mita Graham MD.    XR Shoulder 2+ View Left [[id number]] Collected: 03/07/21 1901     Updated: 03/07/21 1903    Narrative:      Examination: XR SHOULDER 2+ VW LEFT-     Date of Exam: 3/7/2021 6:46 PM     Indication: fall.     Comparison: None available.     Technique: 3 radiographic views of the left shoulder were obtained.     Findings:  There is an acute mildly comminuted mildly displaced left humeral neck  fracture. No dislocation. There is mild acromioclavicular osteophyte  formation. There is maintenance of the acromiohumeral and  coracoclavicular distances.       Impression:      Acute fracture of the left humeral neck.     Electronically Signed By-Alphonse Jasso MD On:3/7/2021 7:01 PM  This report was finalized on [id number] by  Alphonse Jasso MD.             I reviewed the patient's new clinical results.  I reviewed the patient's new imaging results and agree with the interpretation.      Assessment/Plan       Fracture of neck of left humerus, closed, initial encounter    Obesity (BMI 30-39.9)    Anxiety associated with depression    Bipolar 1 disorder (CMS/HCC)    Polycythemia    Hyperlipidemia    Seasonal allergies    Chronic pain    Insomnia      Fracture neck of humerus left in good alignment.  Plan is to obtain a CT scan for further evaluation and possible surgical intervention.    I discussed the patient's findings and my recommendations with patient.  We will follow her back up with a CT scan.    Marques Renae MD  03/08/21  08:33 EST

## 2021-03-08 NOTE — PROGRESS NOTES
[facility] Medicine Services Daily Progress Note      Hospitalist Team  LOS 0 days      Patient Care Team:  Layla Stephens APRN as PCP - General (Nurse Practitioner)    Patient Location: 4123/1      Subjective   Subjective     Chief Complaint / Subjective  Chief Complaint   Patient presents with   • Arm Injury         Brief Synopsis of Hospital Course/HPI  Ms. Levine is a 55 y.o. female past medical history of seasonal allergies, polycythemia, obesity, hyperlipidemia, chronic pain, anxiety/depression and bipolar who presents to [facility] complaining of left shoulder pain.  Patient reports that on the afternoon of 3/7/2021 while taking her dog outside she was pulled off balance and fell off in approximately 3 foot retaining wall landing on her left side primarily her left shoulder.  She denies any trauma to her head or loss of consciousness with these events.  When landing patient notes that she knew immediately she had fractured something in her left shoulder.  She states she began to have a sharp pain made worse with movement rated 10/10 at its worst and 2/10 at the time of my exam while lying still and following pain medication.  She notes that since that time she has also begun to experience a sensation of muscle cramping in her shoulder and across the left side of her clavicle.  She denies any other pain including chest pain, dyspnea, cough or fever, nausea or vomiting, changes in bowel or bladder habits, tachycardia or palpitations.  She reports that she smokes approximately 1 pack of cigarettes per day and drinks alcohol only socially.     In the ED x-ray of left shoulder showed an acute fracture of the left humeral neck         Date::    3/8  Co severe pain  had ct humerus  Had norco at home  Switch to percocet      Review of Systems   All other systems reviewed and are negative.        Objective   Objective      Vital Signs  Temp:  [97.4 °F (36.3 °C)-98.5 °F (36.9 °C)]  "98.1 °F (36.7 °C)  Heart Rate:  [64-73] 73  Resp:  [14-20] 20  BP: ()/(54-79) 120/78  Oxygen Therapy  SpO2: 97 %  Pulse Oximetry Type: Continuous  Device (Oxygen Therapy): nasal cannula  Flow (L/min): 2  Flowsheet Rows      First Filed Value   Admission Height  162.6 cm (64\") Documented at 03/07/2021 1815   Admission Weight  93.4 kg (206 lb) Documented at 03/07/2021 1815        Intake & Output (last 3 days)       03/05 0701 - 03/06 0700 03/06 0701 - 03/07 0700 03/07 0701 - 03/08 0700 03/08 0701 - 03/09 0700    Urine (mL/kg/hr)   800 300 (1)    Total Output   800 300    Net   -800 -300                Lines, Drains & Airways    Active LDAs     Name:   Placement date:   Placement time:   Site:   Days:    Peripheral IV 03/07/21 1818 Right Antecubital   03/07/21 1818    Antecubital   less than 1                  Physical Exam:    Physical Exam  Vitals and nursing note reviewed.   Constitutional:       General: She is not in acute distress.     Appearance: Normal appearance. She is well-developed. She is not ill-appearing, toxic-appearing or diaphoretic.   HENT:      Head: Normocephalic and atraumatic.      Right Ear: Ear canal and external ear normal.      Left Ear: Ear canal and external ear normal.      Nose: Nose normal. No congestion or rhinorrhea.      Mouth/Throat:      Mouth: Mucous membranes are moist.      Pharynx: No oropharyngeal exudate.   Eyes:      General: No scleral icterus.        Right eye: No discharge.         Left eye: No discharge.      Extraocular Movements: Extraocular movements intact.      Conjunctiva/sclera: Conjunctivae normal.      Pupils: Pupils are equal, round, and reactive to light.   Neck:      Thyroid: No thyromegaly.      Vascular: No carotid bruit or JVD.      Trachea: No tracheal deviation.   Cardiovascular:      Rate and Rhythm: Normal rate and regular rhythm.      Pulses: Normal pulses.      Heart sounds: Normal heart sounds. No murmur. No friction rub. No gallop.  "   Pulmonary:      Effort: Pulmonary effort is normal. No respiratory distress.      Breath sounds: Normal breath sounds. No stridor. No wheezing, rhonchi or rales.   Chest:      Chest wall: No tenderness.   Abdominal:      General: Bowel sounds are normal. There is no distension.      Palpations: Abdomen is soft. There is no mass.      Tenderness: There is no abdominal tenderness. There is no guarding or rebound.      Hernia: No hernia is present.   Musculoskeletal:         General: No swelling, tenderness, deformity or signs of injury. Normal range of motion.      Cervical back: Normal range of motion and neck supple. No rigidity. No muscular tenderness.      Right lower leg: No edema.      Left lower leg: No edema.   Lymphadenopathy:      Cervical: No cervical adenopathy.   Skin:     General: Skin is warm and dry.      Coloration: Skin is not jaundiced or pale.      Findings: No bruising, erythema or rash.   Neurological:      General: No focal deficit present.      Mental Status: She is alert and oriented to person, place, and time. Mental status is at baseline.      Cranial Nerves: No cranial nerve deficit.      Sensory: No sensory deficit.      Motor: No weakness or abnormal muscle tone.      Coordination: Coordination normal.   Psychiatric:         Mood and Affect: Mood normal.         Behavior: Behavior normal.         Thought Content: Thought content normal.         Judgment: Judgment normal.               Procedures:              Results Review:     I reviewed the patient's new clinical results.      Lab Results (last 24 hours)     Procedure Component Value Units Date/Time    CBC & Differential [[id number]]  (Abnormal) Collected: 03/08/21 0331    Specimen: Blood Updated: 03/08/21 0537    Narrative:      The following orders were created for panel order CBC & Differential.  Procedure                               Abnormality         Status                     ---------                                -----------         ------                     Scan Slide[[id number]]                                                                  CBC Auto Differential[[id number]]        Abnormal            Final result                 Please view results for these tests on the individual orders.    CBC Auto Differential [[id number]]  (Abnormal) Collected: 03/08/21 0526    Specimen: Blood Updated: 03/08/21 0537     WBC 12.00 10*3/mm3      RBC 4.74 10*6/mm3      Hemoglobin 15.2 g/dL      Hematocrit 43.8 %      MCV 92.5 fL      MCH 32.0 pg      MCHC 34.6 g/dL      RDW 13.1 %      RDW-SD 42.4 fl      MPV 7.7 fL      Platelets 200 10*3/mm3      Neutrophil % 73.8 %      Lymphocyte % 18.4 %      Monocyte % 6.5 %      Eosinophil % 1.0 %      Basophil % 0.3 %      Neutrophils, Absolute 8.90 10*3/mm3      Lymphocytes, Absolute 2.20 10*3/mm3      Monocytes, Absolute 0.80 10*3/mm3      Eosinophils, Absolute 0.10 10*3/mm3      Basophils, Absolute 0.00 10*3/mm3      nRBC 0.0 /100 WBC     Basic Metabolic Panel [[id number]]  (Abnormal) Collected: 03/08/21 0332    Specimen: Blood Updated: 03/08/21 0417     Glucose 141 mg/dL      BUN 12 mg/dL      Creatinine 0.64 mg/dL      Sodium 139 mmol/L      Potassium 3.5 mmol/L      Chloride 99 mmol/L      CO2 29.0 mmol/L      Calcium 8.9 mg/dL      eGFR Non African Amer 97 mL/min/1.73      BUN/Creatinine Ratio 18.8     Anion Gap 11.0 mmol/L     Narrative:      GFR Normal >60  Chronic Kidney Disease <60  Kidney Failure <15      Magnesium [[id number]]  (Normal) Collected: 03/08/21 0332    Specimen: Blood Updated: 03/08/21 0417     Magnesium 1.8 mg/dL     Urinalysis With Microscopic If Indicated (No Culture) - Urine, Clean Catch [[id number]]  (Normal) Collected: 03/08/21 0404    Specimen: Urine, Clean Catch Updated: 03/08/21 0413     Color, UA Yellow     Appearance, UA Clear     pH, UA 6.0     Specific Gravity, UA 1.016     Glucose, UA Negative     Ketones, UA Negative     Bilirubin, UA Negative     Blood, UA  Negative     Protein, UA Negative     Leuk Esterase, UA Negative     Nitrite, UA Negative     Urobilinogen, UA 0.2 E.U./dL    Narrative:      Urine microscopic not indicated.    aPTT [[id number]]  (Normal) Collected: 03/08/21 0332    Specimen: Blood Updated: 03/08/21 0403     PTT 24.9 seconds     Protime-INR [[id number]]  (Normal) Collected: 03/08/21 0332    Specimen: Blood Updated: 03/08/21 0403     Protime 10.9 Seconds      INR 0.99    MRSA Screen, PCR (Inpatient) - Swab, Nares [[id number]]  (Normal) Collected: 03/08/21 0201    Specimen: Swab from Nares Updated: 03/08/21 0321     MRSA PCR No MRSA Detected    Basic Metabolic Panel [[id number]]  (Abnormal) Collected: 03/07/21 2058    Specimen: Blood Updated: 03/07/21 2132     Glucose 140 mg/dL      BUN 11 mg/dL      Creatinine 0.51 mg/dL      Sodium 138 mmol/L      Potassium 3.3 mmol/L      Comment: Slight hemolysis detected by analyzer. Results may be affected.        Chloride 97 mmol/L      CO2 23.0 mmol/L      Calcium 9.1 mg/dL      eGFR Non African Amer 126 mL/min/1.73      BUN/Creatinine Ratio 21.6     Anion Gap 18.0 mmol/L     Narrative:      GFR Normal >60  Chronic Kidney Disease <60  Kidney Failure <15      Magnesium [[id number]]  (Normal) Collected: 03/07/21 2058    Specimen: Blood Updated: 03/07/21 2132     Magnesium 1.7 mg/dL     CBC & Differential [[id number]]  (Abnormal) Collected: 03/07/21 2058    Specimen: Blood Updated: 03/07/21 2105    Narrative:      The following orders were created for panel order CBC & Differential.  Procedure                               Abnormality         Status                     ---------                               -----------         ------                     CBC Auto Differential[[id number]]        Abnormal            Final result                 Please view results for these tests on the individual orders.    CBC Auto Differential [[id number]]  (Abnormal) Collected: 03/07/21 2058    Specimen: Blood Updated: 03/07/21 2105      WBC 12.90 10*3/mm3      RBC 4.86 10*6/mm3      Hemoglobin 15.9 g/dL      Hematocrit 45.1 %      MCV 92.9 fL      MCH 32.7 pg      MCHC 35.2 g/dL      RDW 13.1 %      RDW-SD 42.4 fl      MPV 7.8 fL      Platelets 243 10*3/mm3      Neutrophil % 76.3 %      Lymphocyte % 17.3 %      Monocyte % 4.6 %      Eosinophil % 0.8 %      Basophil % 1.0 %      Neutrophils, Absolute 9.80 10*3/mm3      Lymphocytes, Absolute 2.20 10*3/mm3      Monocytes, Absolute 0.60 10*3/mm3      Eosinophils, Absolute 0.10 10*3/mm3      Basophils, Absolute 0.10 10*3/mm3      nRBC 0.1 /100 WBC     COVID PRE-OP / PRE-PROCEDURE SCREENING ORDER (NO ISOLATION) - Swab, Nasopharynx [[id number]] Collected: 03/07/21 1946    Specimen: Swab from Nasopharynx Updated: 03/07/21 1948    Narrative:      The following orders were created for panel order COVID PRE-OP / PRE-PROCEDURE SCREENING ORDER (NO ISOLATION) - Swab, Nasopharynx.  Procedure                               Abnormality         Status                     ---------                               -----------         ------                     COVID-19,APTIMA PANTHER,...[[id number]]                      In process                   Please view results for these tests on the individual orders.    COVID-19,APTIMA PANTHER,[facility] IN-HOUSE, NP/OP SWAB IN UTM/VTM/SALINE TRANSPORT MEDIA,24 HR TAT - Swab, Nasopharynx [[id number]] Collected: 03/07/21 1946    Specimen: Swab from Nasopharynx Updated: 03/07/21 1948        No results found for: HGBA1C  Results from last 7 days   Lab Units 03/08/21  0332   INR  0.99           No results found for: LIPASE  Lab Results   Component Value Date    CHLPL 169 10/28/2019    TRIG 271 (H) 10/28/2019    HDL 36 (L) 10/28/2019    LDL 79 10/28/2019       No results found for: INTRAOP, PREDX, FINALDX, COMDX    Microbiology Results (last 10 days)     Procedure Component Value - Date/Time    MRSA Screen, PCR (Inpatient) - Swab, Nares [[id number]]  (Normal) Collected: 03/08/21 0201    Lab  Status: Final result Specimen: Swab from Nares Updated: 03/08/21 0321     MRSA PCR No MRSA Detected          ECG/EMG Results (most recent)     Procedure Component Value Units Date/Time    ECG 12 Lead [[id number]] Collected: 03/08/21 0544     Updated: 03/08/21 0546     QT Interval 413 ms     Narrative:      HEART RATE= 68  bpm  RR Interval= 880  ms  MD Interval= 132  ms  P Horizontal Axis= 23  deg  P Front Axis= 47  deg  QRSD Interval= 87  ms  QT Interval= 413  ms  QRS Axis= 62  deg  T Wave Axis= 23  deg  - NORMAL ECG -  Sinus rhythm  When compared with ECG of 19-Oct-2020 12:56:45,  Significant axis, voltage or hypertrophy change  Electronically Signed By:   Date and Time of Study: 2021-03-08 05:44:02                  XR Shoulder 2+ View Left    Result Date: 3/7/2021  Acute fracture of the left humeral neck.  Electronically Signed By-Alphonse Jasso MD On:3/7/2021 7:01 PM This report was finalized on [id number] by  Alphonse Jasso MD.    XR Chest 1 View    Result Date: 3/8/2021  Mild bibasilar subsegmental atelectasis.  Electronically Signed By-Mita Graham MD On:3/8/2021 8:06 AM This report was finalized on [id number] by  Mita Graham MD.          Xrays, labs reviewed personally by physician.    Medication Review:   I have reviewed the patient's current medication list      Scheduled Meds  atenolol-chlorthalidone (TENORETIC) 100-25 combo dose, , Oral, Q24H  atorvastatin, 40 mg, Oral, Daily  escitalopram, 20 mg, Oral, Daily  gabapentin, 400 mg, Oral, TID  metoclopramide, 10 mg, Oral, BID  montelukast, 10 mg, Oral, Nightly  nicotine, 1 patch, Transdermal, Q24H  pantoprazole, 40 mg, Oral, QAM  sodium chloride, 10 mL, Intravenous, Q12H  valACYclovir, 500 mg, Oral, Daily        Meds Infusions       Meds PRN  •  acetaminophen **OR** acetaminophen **OR** acetaminophen  •  Calcium Gluconate-NaCl **AND** calcium gluconate **AND** Calcium, Ionized  •  ketamine (KETALAR) IVPB **AND** Ketamine Vital Signs &  Assessment  •  magnesium sulfate **OR** magnesium sulfate **OR** magnesium sulfate  •  melatonin  •  Morphine  •  ondansetron **OR** ondansetron  •  potassium & sodium phosphates **OR** potassium & sodium phosphates  •  potassium chloride  •  potassium chloride  •  [COMPLETED] Insert peripheral IV **AND** sodium chloride  •  sodium chloride  •  traZODone        Assessment/Plan   Assessment/Plan     Active Hospital Problems    Diagnosis  POA   • **Fracture of neck of left humerus, closed, initial encounter [S42212A]  Yes   • Obesity (BMI 30-39.9) [E66.9]  Yes   • Anxiety associated with depression [F41.8]  Yes   • Bipolar 1 disorder (CMS/HCC) [F31.9]  Yes   • Polycythemia [D75.1]  Yes   • Hyperlipidemia [E78.5]  Yes   • Seasonal allergies [J30.2]  Yes   • Chronic pain [G89.29]  Yes   • Insomnia [G47.00]  Yes      Resolved Hospital Problems   No resolved problems to display.       MEDICAL DECISION MAKING COMPLEXITY BY PROBLEM:   Fall with fracture of left humeral neck  -Patient presents with left shoulder pain following a fall approximately 3 feet on 3/7/2021  - x-ray of left shoulder showed an acute fracture of the left humeral neck  -Morphine and ketamine as needed for severe pain, may consider adding Toradol for pain following results of metabolic profile  -Check CBC and BMP  - mL per hour  -N.p.o. at midnight  -Orthopedic surgery consulted in ED     Hypertension  -Well controlled with a blood pressure on admission of 125/74  - Continue atenolol-chlorthalidone  - Monitor while admitted     Polycythemia  -Documented history of polycythemia from [facility], hemoglobin: 16.5  -Monitor CBC while admitted     Hyperlipidemia  -Continue statin     Depression/anxiety/bipolar/insomnia  -Continue trazodone, Lexapro and Ambien  -Restart Lamictal once dose verified     Chronic pain  -Continue hydrocodone, gabapentin      Seasonal allergies  -Zyrtec and Singulair     Obesity (BMI: 35.36)  -Encouraged on lifestyle  modifications                   VTE Prophylaxis -   Mechanical Order History:      Ordered        03/07/21 2036  Place Sequential Compression Device  Once         03/07/21 2036  Maintain Sequential Compression Device  Continuous                 Pharmalogical Order History:     None            Code Status -   Code Status and Medical Interventions:   Ordered at: 03/07/21 2008     Code Status:    CPR     Medical Interventions (Level of Support Prior to Arrest):    Full       This patient has been examined wearing appropriate Personal Protective Equipment and discussed with hospital infection control department. 03/08/21        Discharge Planning  home        Electronically signed by Dillon Barnett MD, 03/08/21, 10:13 EST.  [facility] Hospitalist Team

## 2021-03-08 NOTE — PROGRESS NOTES
Discharge Planning Assessment   Agustin     Patient Name: Evangelina Levine  MRN: [id number]  Today's Date: 3/8/2021    Admit Date: 3/7/2021    Discharge Needs Assessment     Row Name 03/08/21 1554       Living Environment    Lives With  spouse    Current Living Arrangements  home/apartment/condo    Primary Care Provided by  self    Provides Primary Care For  spouse    Family Caregiver if Needed  spouse    Quality of Family Relationships  helpful;involved;supportive    Able to Return to Prior Arrangements  yes       Resource/Environmental Concerns    Resource/Environmental Concerns  none    Transportation Concerns  car, none       Transition Planning    Patient/Family Anticipates Transition to  home with family    Patient/Family Anticipated Services at Transition  none    Transportation Anticipated  family or friend will provide       Discharge Needs Assessment    Readmission Within the Last 30 Days  no previous admission in last 30 days    Equipment Currently Used at Home  cane, straight;sling;cpap    Concerns to be Addressed  no discharge needs identified;denies needs/concerns at this time    Anticipated Changes Related to Illness  none    Equipment Needed After Discharge  none    Provided Post Acute Provider List?  N/A    Provided Post Acute Provider Quality & Resource List?  N/A    Current Discharge Risk  dependent with mobility/activities of daily living    Discharge Coordination/Progress  Routine discharge home with spouse. Patient denies any needs at this time.        Discharge Plan     Row Name 03/08/21 1556       Plan    Plan  Routine discharge home with spouse. Patient denies any needs at this time.    Patient/Family in Agreement with Plan  yes    Plan Comments  Spoke with patient. Verified her PCP, DME and pharmacy. Patient denies any problems affording food or meds at this time. Barriers to discharge: Waiting on MD to discharge.        Continued Care and Services - Admitted Since 3/7/2021     Coordination has not been started for this encounter.       Expected Discharge Date and Time     Expected Discharge Date Expected Discharge Time    Mar 9, 2021         Demographic Summary     Row Name 03/08/21 1553       General Information    Admission Type  observation    Arrived From  emergency department    Required Notices Provided  Observation Status Notice    Referral Source  admission list    Reason for Consult  discharge planning    Preferred Language  English     Used During This Interaction  no       Contact Information    Permission Granted to Share Info With          Functional Status     Row Name 03/08/21 1554       Functional Status    Usual Activity Tolerance  moderate    Current Activity Tolerance  moderate       Functional Status, IADL    Medications  independent    Meal Preparation  independent    Housekeeping  independent    Laundry  independent    Shopping  independent       Mental Status Summary    Recent Changes in Mental Status/Cognitive Functioning  no changes            Met with patient in room wearing PPE: mask, face shield/goggles, gloves, gown.      Maintained distance greater than six feet and spent less than 15 minutes in the room.        Anna Naegele RN Case Manager  [address]  [address]   [phone]  office  [phone]  fax  [email or web address]

## 2021-03-08 NOTE — PAT
In pt added to surgery schedule for 3/9/2021 with Dr. Martinez.  All testing ordered.  Spoke with bedside nurse Alma, advised NPO after midnight.

## 2021-03-09 ENCOUNTER — APPOINTMENT (OUTPATIENT)
Dept: GENERAL RADIOLOGY | Facility: HOSPITAL | Age: 56
End: 2021-03-09

## 2021-03-09 ENCOUNTER — ANESTHESIA EVENT (OUTPATIENT)
Dept: PERIOP | Facility: HOSPITAL | Age: 56
End: 2021-03-09

## 2021-03-09 ENCOUNTER — ANESTHESIA (OUTPATIENT)
Dept: PERIOP | Facility: HOSPITAL | Age: 56
End: 2021-03-09

## 2021-03-09 LAB
ALBUMIN SERPL-MCNC: 4 G/DL (ref 3.5–5.2)
ALBUMIN/GLOB SERPL: 1.6 G/DL
ALP SERPL-CCNC: 53 U/L (ref 39–117)
ALT SERPL W P-5'-P-CCNC: 36 U/L (ref 1–33)
ANION GAP SERPL CALCULATED.3IONS-SCNC: 10 MMOL/L (ref 5–15)
AST SERPL-CCNC: 24 U/L (ref 1–32)
BASOPHILS # BLD AUTO: 0.1 10*3/MM3 (ref 0–0.2)
BASOPHILS NFR BLD AUTO: 0.8 % (ref 0–1.5)
BILIRUB SERPL-MCNC: 0.4 MG/DL (ref 0–1.2)
BUN SERPL-MCNC: 7 MG/DL (ref 6–20)
BUN/CREAT SERPL: 14 (ref 7–25)
CALCIUM SPEC-SCNC: 9 MG/DL (ref 8.6–10.5)
CHLORIDE SERPL-SCNC: 98 MMOL/L (ref 98–107)
CO2 SERPL-SCNC: 30 MMOL/L (ref 22–29)
CREAT SERPL-MCNC: 0.5 MG/DL (ref 0.57–1)
DEPRECATED RDW RBC AUTO: 42.4 FL (ref 37–54)
EOSINOPHIL # BLD AUTO: 0.1 10*3/MM3 (ref 0–0.4)
EOSINOPHIL NFR BLD AUTO: 1.3 % (ref 0.3–6.2)
ERYTHROCYTE [DISTWIDTH] IN BLOOD BY AUTOMATED COUNT: 13 % (ref 12.3–15.4)
GFR SERPL CREATININE-BSD FRML MDRD: 129 ML/MIN/1.73
GLOBULIN UR ELPH-MCNC: 2.5 GM/DL
GLUCOSE SERPL-MCNC: 145 MG/DL (ref 65–99)
HCT VFR BLD AUTO: 40.7 % (ref 34–46.6)
HGB BLD-MCNC: 14.8 G/DL (ref 12–15.9)
LYMPHOCYTES # BLD AUTO: 1.9 10*3/MM3 (ref 0.7–3.1)
LYMPHOCYTES NFR BLD AUTO: 18.7 % (ref 19.6–45.3)
MAGNESIUM SERPL-MCNC: 1.8 MG/DL (ref 1.6–2.6)
MCH RBC QN AUTO: 33.2 PG (ref 26.6–33)
MCHC RBC AUTO-ENTMCNC: 36.4 G/DL (ref 31.5–35.7)
MCV RBC AUTO: 91.4 FL (ref 79–97)
MONOCYTES # BLD AUTO: 0.8 10*3/MM3 (ref 0.1–0.9)
MONOCYTES NFR BLD AUTO: 7.6 % (ref 5–12)
NEUTROPHILS NFR BLD AUTO: 7.3 10*3/MM3 (ref 1.7–7)
NEUTROPHILS NFR BLD AUTO: 71.6 % (ref 42.7–76)
NRBC BLD AUTO-RTO: 0.1 /100 WBC (ref 0–0.2)
PLATELET # BLD AUTO: 208 10*3/MM3 (ref 140–450)
PMV BLD AUTO: 7.6 FL (ref 6–12)
POTASSIUM SERPL-SCNC: 3.6 MMOL/L (ref 3.5–5.2)
PROT SERPL-MCNC: 6.5 G/DL (ref 6–8.5)
RBC # BLD AUTO: 4.46 10*6/MM3 (ref 3.77–5.28)
SODIUM SERPL-SCNC: 138 MMOL/L (ref 136–145)
WBC # BLD AUTO: 10.1 10*3/MM3 (ref 3.4–10.8)

## 2021-03-09 PROCEDURE — 25010000002 FENTANYL CITRATE (PF) 100 MCG/2ML SOLUTION: Performed by: ANESTHESIOLOGY

## 2021-03-09 PROCEDURE — 76000 FLUOROSCOPY <1 HR PHYS/QHP: CPT

## 2021-03-09 PROCEDURE — C1713 ANCHOR/SCREW BN/BN,TIS/BN: HCPCS | Performed by: ORTHOPAEDIC SURGERY

## 2021-03-09 PROCEDURE — 25010000002 KETOROLAC TROMETHAMINE PER 15 MG: Performed by: ANESTHESIOLOGY

## 2021-03-09 PROCEDURE — 96376 TX/PRO/DX INJ SAME DRUG ADON: CPT

## 2021-03-09 PROCEDURE — 25010000002 CEFAZOLIN PER 500 MG: Performed by: ORTHOPAEDIC SURGERY

## 2021-03-09 PROCEDURE — 25010000002 MORPHINE PER 10 MG: Performed by: PHYSICIAN ASSISTANT

## 2021-03-09 PROCEDURE — 83735 ASSAY OF MAGNESIUM: CPT | Performed by: PHYSICIAN ASSISTANT

## 2021-03-09 PROCEDURE — 73030 X-RAY EXAM OF SHOULDER: CPT

## 2021-03-09 PROCEDURE — G0378 HOSPITAL OBSERVATION PER HR: HCPCS

## 2021-03-09 PROCEDURE — 25010000002 ROPIVACAINE PER 1 MG: Performed by: ANESTHESIOLOGY

## 2021-03-09 PROCEDURE — 76942 ECHO GUIDE FOR BIOPSY: CPT | Performed by: ORTHOPAEDIC SURGERY

## 2021-03-09 PROCEDURE — 85025 COMPLETE CBC W/AUTO DIFF WBC: CPT | Performed by: PHYSICIAN ASSISTANT

## 2021-03-09 PROCEDURE — 25010000002 PROPOFOL 10 MG/ML EMULSION: Performed by: ANESTHESIOLOGY

## 2021-03-09 PROCEDURE — 80053 COMPREHEN METABOLIC PANEL: CPT | Performed by: INTERNAL MEDICINE

## 2021-03-09 PROCEDURE — 25010000002 PROPOFOL 200 MG/20ML EMULSION: Performed by: ANESTHESIOLOGY

## 2021-03-09 PROCEDURE — 25010000002 DEXAMETHASONE PER 1 MG: Performed by: ANESTHESIOLOGY

## 2021-03-09 PROCEDURE — 25010000002 MIDAZOLAM PER 1 MG: Performed by: ANESTHESIOLOGY

## 2021-03-09 PROCEDURE — 97161 PT EVAL LOW COMPLEX 20 MIN: CPT

## 2021-03-09 PROCEDURE — 25010000002 ONDANSETRON PER 1 MG: Performed by: ANESTHESIOLOGY

## 2021-03-09 PROCEDURE — 99225 PR SBSQ OBSERVATION CARE/DAY 25 MINUTES: CPT | Performed by: INTERNAL MEDICINE

## 2021-03-09 DEVICE — CORTEX SCREW
Type: IMPLANTABLE DEVICE | Site: HUMERUS | Status: FUNCTIONAL
Brand: AXSOS

## 2021-03-09 DEVICE — PROXIMAL LATERAL HUMERUS PLATE, LEFT
Type: IMPLANTABLE DEVICE | Site: HUMERUS | Status: FUNCTIONAL
Brand: AXSOS

## 2021-03-09 DEVICE — LOCKING SCREW
Type: IMPLANTABLE DEVICE | Site: HUMERUS | Status: FUNCTIONAL
Brand: AXSOS

## 2021-03-09 RX ORDER — SODIUM CHLORIDE, SODIUM LACTATE, POTASSIUM CHLORIDE, CALCIUM CHLORIDE 600; 310; 30; 20 MG/100ML; MG/100ML; MG/100ML; MG/100ML
INJECTION, SOLUTION INTRAVENOUS CONTINUOUS PRN
Status: DISCONTINUED | OUTPATIENT
Start: 2021-03-09 | End: 2021-03-09 | Stop reason: SURG

## 2021-03-09 RX ORDER — PROMETHAZINE HYDROCHLORIDE 25 MG/1
25 SUPPOSITORY RECTAL ONCE AS NEEDED
Status: DISCONTINUED | OUTPATIENT
Start: 2021-03-09 | End: 2021-03-09

## 2021-03-09 RX ORDER — LORAZEPAM 2 MG/ML
0.5 INJECTION INTRAMUSCULAR
Status: DISCONTINUED | OUTPATIENT
Start: 2021-03-09 | End: 2021-03-09

## 2021-03-09 RX ORDER — DEXAMETHASONE SODIUM PHOSPHATE 4 MG/ML
INJECTION, SOLUTION INTRA-ARTICULAR; INTRALESIONAL; INTRAMUSCULAR; INTRAVENOUS; SOFT TISSUE AS NEEDED
Status: DISCONTINUED | OUTPATIENT
Start: 2021-03-09 | End: 2021-03-09 | Stop reason: SURG

## 2021-03-09 RX ORDER — GLYCOPYRROLATE 1 MG/5 ML
SYRINGE (ML) INTRAVENOUS AS NEEDED
Status: DISCONTINUED | OUTPATIENT
Start: 2021-03-09 | End: 2021-03-09 | Stop reason: SURG

## 2021-03-09 RX ORDER — HYDROCODONE BITARTRATE AND ACETAMINOPHEN 5; 325 MG/1; MG/1
1 TABLET ORAL ONCE AS NEEDED
Status: DISCONTINUED | OUTPATIENT
Start: 2021-03-09 | End: 2021-03-09

## 2021-03-09 RX ORDER — LABETALOL HYDROCHLORIDE 5 MG/ML
INJECTION, SOLUTION INTRAVENOUS AS NEEDED
Status: DISCONTINUED | OUTPATIENT
Start: 2021-03-09 | End: 2021-03-09 | Stop reason: SURG

## 2021-03-09 RX ORDER — NEOSTIGMINE METHYLSULFATE 5 MG/5 ML
SYRINGE (ML) INTRAVENOUS AS NEEDED
Status: DISCONTINUED | OUTPATIENT
Start: 2021-03-09 | End: 2021-03-09 | Stop reason: SURG

## 2021-03-09 RX ORDER — ACETAMINOPHEN 650 MG/1
650 SUPPOSITORY RECTAL ONCE AS NEEDED
Status: DISCONTINUED | OUTPATIENT
Start: 2021-03-09 | End: 2021-03-09

## 2021-03-09 RX ORDER — MEPERIDINE HYDROCHLORIDE 25 MG/ML
12.5 INJECTION INTRAMUSCULAR; INTRAVENOUS; SUBCUTANEOUS
Status: DISCONTINUED | OUTPATIENT
Start: 2021-03-09 | End: 2021-03-09

## 2021-03-09 RX ORDER — ACETAMINOPHEN 325 MG/1
650 TABLET ORAL ONCE AS NEEDED
Status: DISCONTINUED | OUTPATIENT
Start: 2021-03-09 | End: 2021-03-09

## 2021-03-09 RX ORDER — DEXAMETHASONE SODIUM PHOSPHATE 4 MG/ML
INJECTION, SOLUTION INTRA-ARTICULAR; INTRALESIONAL; INTRAMUSCULAR; INTRAVENOUS; SOFT TISSUE
Status: COMPLETED | OUTPATIENT
Start: 2021-03-09 | End: 2021-03-09

## 2021-03-09 RX ORDER — FENTANYL CITRATE 50 UG/ML
INJECTION, SOLUTION INTRAMUSCULAR; INTRAVENOUS AS NEEDED
Status: DISCONTINUED | OUTPATIENT
Start: 2021-03-09 | End: 2021-03-09 | Stop reason: SURG

## 2021-03-09 RX ORDER — ROCURONIUM BROMIDE 10 MG/ML
INJECTION, SOLUTION INTRAVENOUS AS NEEDED
Status: DISCONTINUED | OUTPATIENT
Start: 2021-03-09 | End: 2021-03-09 | Stop reason: SURG

## 2021-03-09 RX ORDER — MIDAZOLAM HYDROCHLORIDE 1 MG/ML
INJECTION INTRAMUSCULAR; INTRAVENOUS
Status: COMPLETED | OUTPATIENT
Start: 2021-03-09 | End: 2021-03-09

## 2021-03-09 RX ORDER — ONDANSETRON 2 MG/ML
INJECTION INTRAMUSCULAR; INTRAVENOUS AS NEEDED
Status: DISCONTINUED | OUTPATIENT
Start: 2021-03-09 | End: 2021-03-09 | Stop reason: SURG

## 2021-03-09 RX ORDER — MORPHINE SULFATE 4 MG/ML
2 INJECTION, SOLUTION INTRAMUSCULAR; INTRAVENOUS
Status: DISCONTINUED | OUTPATIENT
Start: 2021-03-09 | End: 2021-03-09

## 2021-03-09 RX ORDER — ONDANSETRON 2 MG/ML
4 INJECTION INTRAMUSCULAR; INTRAVENOUS ONCE AS NEEDED
Status: DISCONTINUED | OUTPATIENT
Start: 2021-03-09 | End: 2021-03-09

## 2021-03-09 RX ORDER — KETOROLAC TROMETHAMINE 15 MG/ML
15 INJECTION, SOLUTION INTRAMUSCULAR; INTRAVENOUS EVERY 6 HOURS PRN
Status: DISCONTINUED | OUTPATIENT
Start: 2021-03-09 | End: 2021-03-09

## 2021-03-09 RX ORDER — ROPIVACAINE HYDROCHLORIDE 5 MG/ML
INJECTION, SOLUTION EPIDURAL; INFILTRATION; PERINEURAL
Status: COMPLETED | OUTPATIENT
Start: 2021-03-09 | End: 2021-03-09

## 2021-03-09 RX ORDER — NALOXONE HCL 0.4 MG/ML
0.4 VIAL (ML) INJECTION AS NEEDED
Status: DISCONTINUED | OUTPATIENT
Start: 2021-03-09 | End: 2021-03-09

## 2021-03-09 RX ORDER — FLUMAZENIL 0.1 MG/ML
0.5 INJECTION INTRAVENOUS AS NEEDED
Status: DISCONTINUED | OUTPATIENT
Start: 2021-03-09 | End: 2021-03-09

## 2021-03-09 RX ORDER — PROPOFOL 10 MG/ML
INJECTION, EMULSION INTRAVENOUS AS NEEDED
Status: DISCONTINUED | OUTPATIENT
Start: 2021-03-09 | End: 2021-03-09 | Stop reason: SURG

## 2021-03-09 RX ORDER — KETOROLAC TROMETHAMINE 30 MG/ML
INJECTION, SOLUTION INTRAMUSCULAR; INTRAVENOUS AS NEEDED
Status: DISCONTINUED | OUTPATIENT
Start: 2021-03-09 | End: 2021-03-09 | Stop reason: SURG

## 2021-03-09 RX ORDER — PROMETHAZINE HYDROCHLORIDE 25 MG/1
25 TABLET ORAL ONCE AS NEEDED
Status: DISCONTINUED | OUTPATIENT
Start: 2021-03-09 | End: 2021-03-09

## 2021-03-09 RX ADMIN — FENTANYL CITRATE 100 MCG: 50 INJECTION, SOLUTION INTRAMUSCULAR; INTRAVENOUS at 09:42

## 2021-03-09 RX ADMIN — KETOROLAC TROMETHAMINE 30 MG: 30 INJECTION, SOLUTION INTRAMUSCULAR at 08:58

## 2021-03-09 RX ADMIN — ROCURONIUM BROMIDE 30 MG: 10 INJECTION INTRAVENOUS at 09:11

## 2021-03-09 RX ADMIN — ROCURONIUM BROMIDE 20 MG: 10 INJECTION INTRAVENOUS at 09:42

## 2021-03-09 RX ADMIN — PROPOFOL 100 MCG/KG/MIN: 10 INJECTION, EMULSION INTRAVENOUS at 08:16

## 2021-03-09 RX ADMIN — MONTELUKAST 10 MG: 10 TABLET, FILM COATED ORAL at 20:34

## 2021-03-09 RX ADMIN — CEFAZOLIN SODIUM 2 G: 10 INJECTION, POWDER, FOR SOLUTION INTRAVENOUS at 17:10

## 2021-03-09 RX ADMIN — ROPIVACAINE HYDROCHLORIDE 30 ML: 5 INJECTION, SOLUTION EPIDURAL; INFILTRATION; PERINEURAL at 07:24

## 2021-03-09 RX ADMIN — SODIUM CHLORIDE, SODIUM LACTATE, POTASSIUM CHLORIDE, AND CALCIUM CHLORIDE: .6; .31; .03; .02 INJECTION, SOLUTION INTRAVENOUS at 08:09

## 2021-03-09 RX ADMIN — SODIUM CHLORIDE, SODIUM LACTATE, POTASSIUM CHLORIDE, AND CALCIUM CHLORIDE: .6; .31; .03; .02 INJECTION, SOLUTION INTRAVENOUS at 09:21

## 2021-03-09 RX ADMIN — TRAZODONE HYDROCHLORIDE 100 MG: 100 TABLET ORAL at 20:35

## 2021-03-09 RX ADMIN — ROCURONIUM BROMIDE 40 MG: 10 INJECTION INTRAVENOUS at 08:13

## 2021-03-09 RX ADMIN — CEFAZOLIN SODIUM 2 G: 1 INJECTION, POWDER, FOR SOLUTION INTRAMUSCULAR; INTRAVENOUS at 08:19

## 2021-03-09 RX ADMIN — OXYCODONE 10 MG: 5 TABLET ORAL at 03:55

## 2021-03-09 RX ADMIN — ROCURONIUM BROMIDE 10 MG: 10 INJECTION INTRAVENOUS at 08:26

## 2021-03-09 RX ADMIN — GABAPENTIN 400 MG: 400 CAPSULE ORAL at 20:34

## 2021-03-09 RX ADMIN — Medication 10 ML: at 20:35

## 2021-03-09 RX ADMIN — ROCURONIUM BROMIDE 20 MG: 10 INJECTION INTRAVENOUS at 08:54

## 2021-03-09 RX ADMIN — FENTANYL CITRATE 100 MCG: 50 INJECTION, SOLUTION INTRAMUSCULAR; INTRAVENOUS at 08:09

## 2021-03-09 RX ADMIN — Medication 4 MG: at 10:10

## 2021-03-09 RX ADMIN — MORPHINE SULFATE 2 MG: 4 INJECTION INTRAVENOUS at 01:17

## 2021-03-09 RX ADMIN — Medication 0.6 MG: at 10:10

## 2021-03-09 RX ADMIN — ONDANSETRON 4 MG: 2 INJECTION INTRAMUSCULAR; INTRAVENOUS at 08:58

## 2021-03-09 RX ADMIN — PROPOFOL 200 MG: 10 INJECTION, EMULSION INTRAVENOUS at 08:13

## 2021-03-09 RX ADMIN — GABAPENTIN 400 MG: 400 CAPSULE ORAL at 17:10

## 2021-03-09 RX ADMIN — Medication 1 PATCH: at 20:35

## 2021-03-09 RX ADMIN — DEXAMETHASONE SODIUM PHOSPHATE 4 MG: 4 INJECTION, SOLUTION INTRAMUSCULAR; INTRAVENOUS at 08:58

## 2021-03-09 RX ADMIN — LABETALOL 20 MG/4 ML (5 MG/ML) INTRAVENOUS SYRINGE 5 MG: at 09:00

## 2021-03-09 RX ADMIN — DEXAMETHASONE SODIUM PHOSPHATE 4 MG: 4 INJECTION, SOLUTION INTRAMUSCULAR; INTRAVENOUS at 07:24

## 2021-03-09 RX ADMIN — MIDAZOLAM 2 MG: 1 INJECTION INTRAMUSCULAR; INTRAVENOUS at 07:24

## 2021-03-09 RX ADMIN — METOCLOPRAMIDE 10 MG: 10 TABLET ORAL at 20:34

## 2021-03-09 NOTE — PLAN OF CARE
Goal Outcome Evaluation:  Plan of Care Reviewed With: patient     Outcome Summary: 56 y/o F POD 0 proximal humerus ORIF due to fx sustained from fall walking dog down steps. Pt denies falls or balance issues typically. She does use a STC PRN due to chronic ankle injury and leg length discrepancy. Patient is independent at baseline and cares for her  who recently had a CVA. Does not have to physically assist. He is SBA with mobility. Pt is NWB L UE and has L UE in sling. Assisted pt to adjust sling to proper fit with wrist and elbow level to prevent hand/wrist edema. Patient was independent for bed mobility, transfers and ambulation. From PT standpoint, she is safe to d/c home with HH to address shoulder exercises and ROM. PPE: mask, goggles, gloves.

## 2021-03-09 NOTE — ANESTHESIA PROCEDURE NOTES
Peripheral Block      Patient reassessed immediately prior to procedure    Patient location during procedure: pre-op  Start time: 3/9/2021 7:18 AM  Stop time: 3/9/2021 7:25 AM  Reason for block: procedure for pain, at surgeon's request and post-op pain management  Performed by  Anesthesiologist: Aldo Lowery MD  Assisted by: Ramon Nance RN  Preanesthetic Checklist  Completed: patient identified, IV checked, site marked, risks and benefits discussed, surgical consent, monitors and equipment checked, pre-op evaluation and timeout performed  Prep:  Pt Position: supine  Sterile barriers:cap, gloves, sterile barriers, mask and gown  Prep: ChloraPrep  Patient monitoring: blood pressure monitoring, continuous pulse oximetry and EKG  Procedure  Sedation:yes  Performed under: local infiltration  Guidance:ultrasound guided  ULTRASOUND INTERPRETATION. Using ultrasound guidance a 20 G gauge needle was placed in close proximity to the nerve, at which point, under ultrasound guidance anesthetic was injected in the area of the nerve and spread of the anesthesia was seen on ultrasound in close proximity thereto.  There were no abnormalities seen on ultrasound; a digital image was taken; and the patient tolerated the procedure with no complications. Images:still images obtained, printed/placed on chart    Laterality:left  Block Type:supraclavicular  Injection Technique:single-shot  Needle Type:echogenic  Needle Gauge:20 G  Resistance on Injection: less than 15 psi  Sedation medications used: midazolam (VERSED) injection, 2 mg  Medications Used: dexamethasone (DECADRON) injection, 4 mg  ropivacaine (NAROPIN) 0.5 % injection, 30 mL  Med admintered at 3/9/2021 7:24 AM      Medications  Comment:Well visualized. Picture printed and saved in chart. Careful aspiration every 5cc or less    Post Assessment  Injection Assessment: negative aspiration for heme, no paresthesia on injection and incremental injection  Patient  Tolerance:comfortable throughout block  Complications:no

## 2021-03-09 NOTE — PROGRESS NOTES
Continued Stay Note  RIAZ Agustin     Patient Name: Evangelina Levine  MRN: [id number]  Today's Date: 3/9/2021    Admit Date: 3/7/2021    Discharge Plan     Row Name 03/09/21 1315       Plan    Plan  PT/OT eval ordered. Referral to [facility], ordered and pending acceptance.    Provided Post Acute Provider List?  Yes    Post Acute Provider List  Home Health    Delivered To  Patient    Method of Delivery  Telephone    Patient/Family in Agreement with Plan  yes    Plan Comments  Attempted to meet with patient at bedside, off unit. Later called and spoke to patient via room phone, discussed the potential need for home health on d/c. She is agreeable, choose [facility]. Referral made and pending acceptance. ORIF today. Discharge anticipated tomorrow.        Phone communication or documentation only - no physical contact with patient or family.      Shefali Brooks RN

## 2021-03-09 NOTE — PLAN OF CARE
Pt resting. Pt complaining of pain, treated per MAR. Pt ambulatory with one person assist. Vitals stable. Pt to have surgery this AM. Will continue to monitor.     Problem: Adult Inpatient Plan of Care  Goal: Plan of Care Review  Outcome: Ongoing, Progressing  Flowsheets (Taken 3/9/2021 0156)  Progress: no change  Plan of Care Reviewed With: patient

## 2021-03-09 NOTE — ANESTHESIA POSTPROCEDURE EVALUATION
Patient: Evangelina Levine    Procedure Summary     Date: 03/09/21 Room / Location: [facility] OR 06 / [facility] MAIN OR    Anesthesia Start: 0809 Anesthesia Stop: 1024    Procedure: HUMERUS PROXIMAL OPEN REDUCTION INTERNAL FIXATION (Left Arm Upper) Diagnosis:     Surgeons: Marques Renae MD Provider: Aldo Lowery MD    Anesthesia Type: general with block ASA Status: 3          Anesthesia Type: general with block    Vitals  Vitals Value Taken Time   /47 03/09/21 1026   Temp 97.2 °F (36.2 °C) 03/09/21 1024   Pulse 85 03/09/21 1026   Resp 17 03/09/21 1024   SpO2 90 % 03/09/21 1026   Vitals shown include unvalidated device data.        Post Anesthesia Care and Evaluation    Patient location during evaluation: bedside  Patient participation: complete - patient participated  Level of consciousness: awake and alert  Pain score: 1  Pain management: adequate  Airway patency: patent  Anesthetic complications: No anesthetic complications  PONV Status: none  Cardiovascular status: acceptable  Respiratory status: acceptable  Hydration status: acceptable    Comments: Awake and conversant.  Block working well.   Pulmonary toilet stressed with Pacu RN

## 2021-03-09 NOTE — THERAPY EVALUATION
Patient Name: Evangelina Levine  : 1965    MRN: [id number]                              Today's Date: 3/9/2021       Admit Date: 3/7/2021    Visit Dx:     ICD-10-CM ICD-9-CM   1. Fracture of neck of left humerus, closed, initial encounter  S42.212A 812.01   2. Fall, initial encounter  W19.XXXA E888.9     Patient Active Problem List   Diagnosis   • Fracture of neck of left humerus, closed, initial encounter   • Obesity (BMI 30-39.9)   • Anxiety associated with depression   • Bipolar 1 disorder (CMS/HCC)   • Polycythemia   • Hyperlipidemia   • Seasonal allergies   • Chronic pain   • Insomnia     History reviewed. No pertinent past medical history.  Past Surgical History:   Procedure Laterality Date   • CHOLECYSTECTOMY     • COLONOSCOPY     • ENDOSCOPY     • HYSTERECTOMY     • ORIF TIBIA/FIBULA FRACTURES Left      General Information     Row Name 21 1541          Physical Therapy Time and Intention    Document Type  evaluation  -     Mode of Treatment  physical therapy  -     Row Name 21 1541          General Information    Patient Profile Reviewed  yes independent and cares for spouse who recently had stroke  -     Prior Level of Function  independent:  -SS     Existing Precautions/Restrictions  fall;non-weight bearing;left  -SS     Row Name 21 1708          Stairs Within Home, Primary    Number of Stairs, Within Home, Primary  one  -     Row Name 21 1708          Cognition    Orientation Status (Cognition)  oriented x 4  -SS       User Key  (r) = Recorded By, (t) = Taken By, (c) = Cosigned By    Initials Name Provider Type     Rossy Johansen, AIDAN Physical Therapist        Mobility     Row Name 21 1710          Bed Mobility    Bed Mobility  bed mobility (all) activities  -SS     All Activities, [place] (Bed Mobility)  independent  -     Row Name 21 1710          Sit-Stand Transfer    Sit-Stand [place] (Transfers)  independent  -     Row Name  03/09/21 1710          Gait/Stairs (Locomotion)    [place] Level (Gait)  independent  -     Distance in Feet (Gait)  100'  -     Comment (Gait/Stairs)  leg length discrepancy on L at baseline related to chronic ankle injury  -     Row Name 03/09/21 1710          Mobility    Extremity Weight-bearing Status  left upper extremity  -     Left Upper Extremity (Weight-bearing Status)  non weight-bearing (NWB)  -       User Key  (r) = Recorded By, (t) = Taken By, (c) = Cosigned By    Initials Name Provider Type    Rossy Appiah PT Physical Therapist        Obj/Interventions     Row Name 03/09/21 1711          Range of Motion Comprehensive    Comment, General Range of Motion  B LE WFL  -SS     [facility] Name 03/09/21 1711          Strength Comprehensive (MMT)    Comment, General Manual Muscle Testing (MMT) Assessment  B LE WFL  -[facility] Name 03/09/21 1711          Balance    Balance Assessment  sitting static balance;standing static balance  -     Static Sitting Balance  WNL  -     Static Standing Balance  WNL  -[facility] Name 03/09/21 1711          Sensory Assessment (Somatosensory)    Sensory Assessment (Somatosensory)  sensation intact  -       User Key  (r) = Recorded By, (t) = Taken By, (c) = Cosigned By    Initials Name Provider Type    Rossy Appiah PT Physical Therapist        Goals/Plan    No documentation.       Clinical Impression     Row Name 03/09/21 1712          Pain    Additional Documentation  Pain Scale: FACES Pre/Post-Treatment (Group)  -[facility] Name 03/09/21 1712          Pain Scale: FACES Pre/Post-Treatment    Pain: FACES Scale, Pretreatment  0-->no hurt  -     Posttreatment Pain Rating  0-->no hurt  -     Row Name 03/09/21 1712          Plan of Care Review    Plan of Care Reviewed With  patient  -SS     Outcome Summary  54 y/o F POD 0 proximal humerus ORIF due to fx sustained from fall walking dog down steps. Pt denies falls or balance issues typically. She  does use a STC PRN due to chronic ankle injury and leg length discrepancy. Patient is independent at baseline and cares for her  who recently had a CVA. Does not have to physically assist. He is SBA with mobility. Pt is NWB L UE and has L UE in sling. Assisted pt to adjust sling to proper fit with wrist and elbow level to prevent hand/wrist edema. Patient was independent for bed mobility, transfers and ambulation. From PT standpoint, she is safe to d/c home with HH to address shoulder exercises and ROM. PPE: mask, goggles, gloves.  -     Row Name 03/09/21 1712          Therapy Assessment/Plan (PT)    Criteria for Skilled Interventions Met (PT)  no  -SS       User Key  (r) = Recorded By, (t) = Taken By, (c) = Cosigned By    Initials Name Provider Type     Rossy Johansen, AIDAN Physical Therapist        Outcome Measures    No documentation.       Physical Therapy Education                 Title: PT OT SLP Therapies (Done)     Topic: Physical Therapy (Done)     Point: Mobility training (Done)     Learning Progress Summary           Patient Acceptance, E, VU by  at 3/9/2021 1717                               User Key     Initials Effective Dates Name Provider Type Discipline     06/19/19 -  Rossy Johansen PT Physical Therapist PT              PT Recommendation and Plan     Plan of Care Reviewed With: patient  Outcome Summary: 56 y/o F POD 0 proximal humerus ORIF due to fx sustained from fall walking dog down steps. Pt denies falls or balance issues typically. She does use a STC PRN due to chronic ankle injury and leg length discrepancy. Patient is independent at baseline and cares for her  who recently had a CVA. Does not have to physically assist. He is SBA with mobility. Pt is NWB L UE and has L UE in sling. Assisted pt to adjust sling to proper fit with wrist and elbow level to prevent hand/wrist edema. Patient was independent for bed mobility, transfers and ambulation. From PT  standpoint, she is safe to d/c home with HH to address shoulder exercises and ROM. PPE: mask, goggles, gloves.     Time Calculation:   PT Charges     Row Name 03/09/21 1717             Time Calculation    Start Time  1509  -      Stop Time  1521  -      Time Calculation (min)  12 min  -      PT Received On  03/09/21  -         Time Calculation- PT    Total Timed Code Minutes- PT  0 minute(s)  -        User Key  (r) = Recorded By, (t) = Taken By, (c) = Cosigned By    Initials Name Provider Type     Rossy Johansen, PT Physical Therapist        Therapy Charges for Today     Code Description Service Date Service Provider Modifiers Qty    50703052749 HC PT EVAL LOW COMPLEXITY 3 3/9/2021 Rossy Johansen PT GP 1               Rossy Johansen PT  3/9/2021

## 2021-03-09 NOTE — DISCHARGE PLACEMENT REQUEST
"Evangelina Cardenas (55 y.o. Female)     Date of Birth Social Security Number Address Home Phone MRN    1965  [address] [address] [phone] [id number]    Orthodox Marital Status          [employer]ar[employer]        Admission Date Admission Type Admitting Provider Attending Provider Department, Room/Bed    3/7/21 Emergency Dillon Barnett MD Gad, George Fayez Labib Youssief, MD [facility] SURGICAL INPATIENT, 4123/1    Discharge Date Discharge Disposition Discharge Destination                       Attending Provider: Dillon Barnett MD    Allergies: Sulfa Antibiotics    Isolation: None   Infection: None   Code Status: CPR    Ht: 162.6 cm (64\")   Wt: 93.4 kg (206 lb)    Admission Cmt: None   Principal Problem: Fracture of neck of left humerus, closed, initial encounter [S42.212A]                 Active Insurance as of 3/7/2021     Primary Coverage     Payor Plan Insurance Group Employer/Plan Group    [facility] MEDICARE REPLACEMENT [employer] MEDICARE REPLACEMENT [id number]     Payor Plan Address Payor Plan Phone Number Payor Plan Fax Number Effective Dates    [facility] [phone]  1/1/2020 - None Entered    [address]       [facility] [id number]       Subscriber Name Subscriber Birth Date Member ID       EVANGELINA CARDENAS 1965 [id number]                 Emergency Contacts      (Rel.) Home Phone Work Phone Mobile Phone    Alfredo Cardenas (Spouse) -- -- [phone]              "

## 2021-03-09 NOTE — PROGRESS NOTES
[facility] Medicine Services Daily Progress Note      Hospitalist Team  LOS 0 days      Patient Care Team:  Layla Stephens APRN as PCP - General (Nurse Practitioner)    Patient Location: 4123/1      Subjective   Subjective     Chief Complaint / Subjective  Chief Complaint   Patient presents with   • Arm Injury         Brief Synopsis of Hospital Course/HPI  Ms. Levine is a 55 y.o. female past medical history of seasonal allergies, polycythemia, obesity, hyperlipidemia, chronic pain, anxiety/depression and bipolar who presents to [facility] complaining of left shoulder pain.  Patient reports that on the afternoon of 3/7/2021 while taking her dog outside she was pulled off balance and fell off in approximately 3 foot retaining wall landing on her left side primarily her left shoulder.  She denies any trauma to her head or loss of consciousness with these events.  When landing patient notes that she knew immediately she had fractured something in her left shoulder.  She states she began to have a sharp pain made worse with movement rated 10/10 at its worst and 2/10 at the time of my exam while lying still and following pain medication.  She notes that since that time she has also begun to experience a sensation of muscle cramping in her shoulder and across the left side of her clavicle.  She denies any other pain including chest pain, dyspnea, cough or fever, nausea or vomiting, changes in bowel or bladder habits, tachycardia or palpitations.  She reports that she smokes approximately 1 pack of cigarettes per day and drinks alcohol only socially.     In the ED x-ray of left shoulder showed an acute fracture of the left humeral neck         Date::    3/8  Co severe pain  had ct humerus  Had norco at home  Switch to percocet    3/9  Just had surgey today  Pulse ox 88%RA  No new co      Review of Systems   All other systems reviewed and are negative.        Objective   Objective   "    Vital Signs  Temp:  [97 °F (36.1 °C)-98.8 °F (37.1 °C)] 97.6 °F (36.4 °C)  Heart Rate:  [65-87] 77  Resp:  [16-20] 16  BP: (110-152)/(47-80) 120/78  Oxygen Therapy  SpO2: (!) 89 %  Pulse Oximetry Type: Continuous  Device (Oxygen Therapy): room air  Flow (L/min): 2  Flowsheet Rows      First Filed Value   Admission Height  162.6 cm (64\") Documented at 03/07/2021 1815   Admission Weight  93.4 kg (206 lb) Documented at 03/07/2021 1815        Intake & Output (last 3 days)       03/06 0701 - 03/07 0700 03/07 0701 - 03/08 0700 03/08 0701 - 03/09 0700 03/09 0701 - 03/10 0700    P.O.   720     I.V. (mL/kg)    2600 (27.8)    Total Intake(mL/kg)   720 (7.7) 2600 (27.8)    Urine (mL/kg/hr)  800 3650 (1.6) 400 (0.9)    Total Output  800 3650 400    Net  -800 -2930 +2200                Lines, Drains & Airways    Active LDAs     Name:   Placement date:   Placement time:   Site:   Days:    Peripheral IV 03/07/21 1818 Right Antecubital   03/07/21 1818    Antecubital   less than 1                  Physical Exam:    Physical Exam  Vitals and nursing note reviewed.   Constitutional:       General: She is not in acute distress.     Appearance: Normal appearance. She is well-developed. She is not ill-appearing, toxic-appearing or diaphoretic.   HENT:      Head: Normocephalic and atraumatic.      Right Ear: Ear canal and external ear normal.      Left Ear: Ear canal and external ear normal.      Nose: Nose normal. No congestion or rhinorrhea.      Mouth/Throat:      Mouth: Mucous membranes are moist.      Pharynx: No oropharyngeal exudate.   Eyes:      General: No scleral icterus.        Right eye: No discharge.         Left eye: No discharge.      Extraocular Movements: Extraocular movements intact.      Conjunctiva/sclera: Conjunctivae normal.      Pupils: Pupils are equal, round, and reactive to light.   Neck:      Thyroid: No thyromegaly.      Vascular: No carotid bruit or JVD.      Trachea: No tracheal deviation. "   Cardiovascular:      Rate and Rhythm: Normal rate and regular rhythm.      Pulses: Normal pulses.      Heart sounds: Normal heart sounds. No murmur. No friction rub. No gallop.    Pulmonary:      Effort: Pulmonary effort is normal. No respiratory distress.      Breath sounds: Normal breath sounds. No stridor. No wheezing, rhonchi or rales.   Chest:      Chest wall: No tenderness.   Abdominal:      General: Bowel sounds are normal. There is no distension.      Palpations: Abdomen is soft. There is no mass.      Tenderness: There is no abdominal tenderness. There is no guarding or rebound.      Hernia: No hernia is present.   Musculoskeletal:         General: No swelling, tenderness, deformity or signs of injury. Normal range of motion.      Cervical back: Normal range of motion and neck supple. No rigidity. No muscular tenderness.      Right lower leg: No edema.      Left lower leg: No edema.   Lymphadenopathy:      Cervical: No cervical adenopathy.   Skin:     General: Skin is warm and dry.      Coloration: Skin is not jaundiced or pale.      Findings: No bruising, erythema or rash.   Neurological:      General: No focal deficit present.      Mental Status: She is alert and oriented to person, place, and time. Mental status is at baseline.      Cranial Nerves: No cranial nerve deficit.      Sensory: No sensory deficit.      Motor: No weakness or abnormal muscle tone.      Coordination: Coordination normal.   Psychiatric:         Mood and Affect: Mood normal.         Behavior: Behavior normal.         Thought Content: Thought content normal.         Judgment: Judgment normal.              Wounds (last 24 hours)      LDA Wound     Row Name 03/09/21 1123 03/09/21 1107 03/09/21 1054       Wound 03/09/21 0851 Left anterior shoulder    Wound - Properties Group Placement Date: 03/09/21  -CW Placement Time: 0851  -CW Present on Hospital Admission: N  -CW Side: Left  -CW Orientation: anterior  -CW Location: shoulder  -CW     Dressing Appearance  dry;intact  -AH  dry;intact;no drainage  -EC  dry;intact;no drainage  -EC    Closure  AUTUMN  -AH  Staples  -EC  Staples  -EC    Base  dressing in place, unable to visualize  -AH  --  --    Retired Wound - Properties Group Date first assessed: 03/09/21  -CW Time first assessed: 0851  -CW Present on Hospital Admission: N  -CW Side: Left  -CW Location: shoulder  -CW       Wound (NICU) 03/09/21 0745 Left posterior elbow Abrasion    Wound - Properties Group Placement Date: 03/09/21  -CW Placement Time: 0745  -CW Present on Hospital Admission: Y  -CW Side: Left  -CW Orientation: posterior  -CW Location: elbow  -CW Primary Wound Type: Abrasion  -CW    Retired Wound - Properties Group Date first assessed: 03/09/21  -CW Time first assessed: 0745  -CW Present on Hospital Admission: Y  -CW Side: Left  -CW Location: elbow  -CW Primary Wound Type: Abrasion  -CW    Row Name 03/09/21 1039 03/09/21 1033 03/09/21 1024       Wound 03/09/21 0851 Left anterior shoulder    Wound - Properties Group Placement Date: 03/09/21  -CW Placement Time: 0851  -CW Present on Hospital Admission: N  -CW Side: Left  -CW Orientation: anterior  -CW Location: shoulder  -CW    Dressing Appearance  dry;intact;no drainage  -EC  -- staples, xeropform, fluffs, abd, tape and sling  -CW  dry;intact;no drainage  -EC    Closure  Staples  -EC  --  Staples  -EC    Dressing Care  --  --  -- xeroform fluffs abd tape sling  -EC    Retired Wound - Properties Group Date first assessed: 03/09/21  -CW Time first assessed: 0851  -CW Present on Hospital Admission: N  -CW Side: Left  -CW Location: shoulder  -CW       Wound (NICU) 03/09/21 0745 Left posterior elbow Abrasion    Wound - Properties Group Placement Date: 03/09/21  -CW Placement Time: 0745  -CW Present on Hospital Admission: Y  -CW Side: Left  -CW Orientation: posterior  -CW Location: elbow  -CW Primary Wound Type: Abrasion  -CW    Retired Wound - Properties Group Date first assessed:  03/09/21  -CW Time first assessed: 0745  -CW Present on Hospital Admission: Y  -CW Side: Left  -CW Location: elbow  -CW Primary Wound Type: Abrasion  -CW    Row Name 03/09/21 1004             Wound 03/09/21 0851 Left anterior shoulder    Wound - Properties Group Placement Date: 03/09/21  -CW Placement Time: 0851  -CW Present on Hospital Admission: N  -CW Side: Left  -CW Orientation: anterior  -CW Location: shoulder  -CW    Dressing Appearance  -- xeroform, fluffs, abd, tape, sling  -CW      Retired Wound - Properties Group Date first assessed: 03/09/21  -CW Time first assessed: 0851  -CW Present on Hospital Admission: N  -CW Side: Left  -CW Location: shoulder  -CW       Wound (NICU) 03/09/21 0745 Left posterior elbow Abrasion    Wound - Properties Group Placement Date: 03/09/21  -CW Placement Time: 0745  -CW Present on Hospital Admission: Y  -CW Side: Left  -CW Orientation: posterior  -CW Location: elbow  -CW Primary Wound Type: Abrasion  -CW    Retired Wound - Properties Group Date first assessed: 03/09/21  -CW Time first assessed: 0745  -CW Present on Hospital Admission: Y  -CW Side: Left  -CW Location: elbow  -CW Primary Wound Type: Abrasion  -CW      User Key  (r) = Recorded By, (t) = Taken By, (c) = Cosigned By    Initials Name Provider Type    Halie Arnold RN Registered Nurse    Jelena Morales RN Registered Nurse    Alma Feliz LPN Licensed Nurse          Procedures:              Results Review:     I reviewed the patient's new clinical results.      Lab Results (last 24 hours)     Procedure Component Value Units Date/Time    Magnesium [[id number]]  (Normal) Collected: 03/09/21 0214    Specimen: Blood Updated: 03/09/21 0305     Magnesium 1.8 mg/dL     Comprehensive Metabolic Panel [[id number]]  (Abnormal) Collected: 03/09/21 0214    Specimen: Blood Updated: 03/09/21 0305     Glucose 145 mg/dL      BUN 7 mg/dL      Creatinine 0.50 mg/dL      Sodium 138 mmol/L      Potassium 3.6 mmol/L       Comment: Slight hemolysis detected by analyzer. Results may be affected.        Chloride 98 mmol/L      CO2 30.0 mmol/L      Calcium 9.0 mg/dL      Total Protein 6.5 g/dL      Albumin 4.00 g/dL      ALT (SGPT) 36 U/L      AST (SGOT) 24 U/L      Alkaline Phosphatase 53 U/L      Total Bilirubin 0.4 mg/dL      eGFR Non African Amer 129 mL/min/1.73      Globulin 2.5 gm/dL      A/G Ratio 1.6 g/dL      BUN/Creatinine Ratio 14.0     Anion Gap 10.0 mmol/L     Narrative:      GFR Normal >60  Chronic Kidney Disease <60  Kidney Failure <15      CBC & Differential [[id number]]  (Abnormal) Collected: 03/09/21 0214    Specimen: Blood Updated: 03/09/21 0234    Narrative:      The following orders were created for panel order CBC & Differential.  Procedure                               Abnormality         Status                     ---------                               -----------         ------                     CBC Auto Differential[[id number]]        Abnormal            Final result                 Please view results for these tests on the individual orders.    CBC Auto Differential [[id number]]  (Abnormal) Collected: 03/09/21 0214    Specimen: Blood Updated: 03/09/21 0234     WBC 10.10 10*3/mm3      RBC 4.46 10*6/mm3      Hemoglobin 14.8 g/dL      Hematocrit 40.7 %      MCV 91.4 fL      MCH 33.2 pg      MCHC 36.4 g/dL      RDW 13.0 %      RDW-SD 42.4 fl      MPV 7.6 fL      Platelets 208 10*3/mm3      Neutrophil % 71.6 %      Lymphocyte % 18.7 %      Monocyte % 7.6 %      Eosinophil % 1.3 %      Basophil % 0.8 %      Neutrophils, Absolute 7.30 10*3/mm3      Lymphocytes, Absolute 1.90 10*3/mm3      Monocytes, Absolute 0.80 10*3/mm3      Eosinophils, Absolute 0.10 10*3/mm3      Basophils, Absolute 0.10 10*3/mm3      nRBC 0.1 /100 WBC         No results found for: HGBA1C  Results from last 7 days   Lab Units 03/08/21  0332   INR  0.99           No results found for: LIPASE  Lab Results   Component Value Date    CHLPL 169  10/28/2019    TRIG 271 (H) 10/28/2019    HDL 36 (L) 10/28/2019    LDL 79 10/28/2019       No results found for: INTRAOP, PREDX, FINALDX, COMDX    Microbiology Results (last 10 days)     Procedure Component Value - Date/Time    MRSA Screen, PCR (Inpatient) - Swab, Nares [[id number]]  (Normal) Collected: 03/08/21 0201    Lab Status: Final result Specimen: Swab from Nares Updated: 03/08/21 0321     MRSA PCR No MRSA Detected    COVID PRE-OP / PRE-PROCEDURE SCREENING ORDER (NO ISOLATION) - Swab, Nasopharynx [[id number]]  (Normal) Collected: 03/07/21 1946    Lab Status: Final result Specimen: Swab from Nasopharynx Updated: 03/08/21 1422    Narrative:      The following orders were created for panel order COVID PRE-OP / PRE-PROCEDURE SCREENING ORDER (NO ISOLATION) - Swab, Nasopharynx.  Procedure                               Abnormality         Status                     ---------                               -----------         ------                     COVID-19,APTIMA PANTHER,...[[id number]]  Normal              Final result                 Please view results for these tests on the individual orders.    COVID-19,APTIMA PANTHER,[facility] IN-HOUSE, NP/OP SWAB IN UTM/VTM/[facility] TRANSPORT MEDIA,24 HR TAT - Swab, Nasopharynx [[id number]]  (Normal) Collected: 03/07/21 1946    Lab Status: Final result Specimen: Swab from Nasopharynx Updated: 03/08/21 1422     COVID19 Not Detected    Narrative:      Fact sheet for providers: https://www.fda.gov/media/406194/download     Fact sheet for patients: https://www.fda.gov/media/765731/download    Test performed by RT PCR.          ECG/EMG Results (most recent)     Procedure Component Value Units Date/Time    ECG 12 Lead [[id number]] Collected: 03/08/21 0544     Updated: 03/08/21 0546     QT Interval 413 ms     Narrative:      HEART RATE= 68  bpm  RR Interval= 880  ms  AR Interval= 132  ms  P Horizontal Axis= 23  deg  P Front Axis= 47  deg  QRSD Interval= 87  ms  QT Interval= 413  ms  QRS Axis=  62  deg  T Wave Axis= 23  deg  - NORMAL ECG -  Sinus rhythm  When compared with ECG of 19-Oct-2020 12:56:45,  Significant axis, voltage or hypertrophy change  Electronically Signed By:   Date and Time of Study: 2021-03-08 05:44:02                  XR Shoulder 2+ View Left    Result Date: 3/7/2021  Acute fracture of the left humeral neck.  Electronically Signed By-Alphonse Jasso MD On:3/7/2021 7:01 PM This report was finalized on [id number] by  Alphonse Jasso MD.    XR Chest 1 View    Result Date: 3/8/2021  Mild bibasilar subsegmental atelectasis.  Electronically Signed By-Mita Graham MD On:3/8/2021 8:06 AM This report was finalized on [id number] by  Mita Graham MD.    CT Upper Extremity Left Without Contrast    Result Date: 3/8/2021  1.Comminuted fracture of the proximal humerus which appears to involve the surgical and anatomic necks, and greater tuberosity. 2.There is to be impaction and anterior angulation at the surgical neck fracture site with anterior displacement of the distal humerus by an estimated 5 mm. 3.Calcific tendinopathy of the rotator cuff. 4.Mild glenohumeral and moderate acromioclavicular joint degeneration. Electronically Signed: Nabor Velazquez MD 3/8/2021 13:22 EST          Xrays, labs reviewed personally by physician.    Medication Review:   I have reviewed the patient's current medication list      Scheduled Meds  atenolol-chlorthalidone (TENORETIC) 100-25 combo dose, , Oral, Q24H  atorvastatin, 40 mg, Oral, Daily  ceFAZolin, 2 g, Intravenous, Q8H  enoxaparin, 40 mg, Subcutaneous, Q24H  escitalopram, 20 mg, Oral, Daily  gabapentin, 400 mg, Oral, TID  metoclopramide, 10 mg, Oral, BID  montelukast, 10 mg, Oral, Nightly  nicotine, 1 patch, Transdermal, Q24H  pantoprazole, 40 mg, Oral, QAM  sodium chloride, 10 mL, Intravenous, Q12H  valACYclovir, 500 mg, Oral, Daily        Meds Infusions       Meds PRN  •  acetaminophen **OR** acetaminophen **OR** acetaminophen  •  Calcium  Gluconate-NaCl **AND** calcium gluconate **AND** Calcium, Ionized  •  ketamine (KETALAR) IVPB **AND** Ketamine Vital Signs & Assessment  •  magnesium sulfate **OR** magnesium sulfate **OR** magnesium sulfate  •  melatonin  •  Morphine  •  ondansetron **OR** ondansetron  •  oxyCODONE  •  potassium & sodium phosphates **OR** potassium & sodium phosphates  •  potassium chloride  •  potassium chloride  •  [COMPLETED] Insert peripheral IV **AND** sodium chloride  •  sodium chloride  •  traZODone        Assessment/Plan   Assessment/Plan     Active Hospital Problems    Diagnosis  POA   • **Fracture of neck of left humerus, closed, initial encounter [S42212A]  Yes   • Obesity (BMI 30-39.9) [E66.9]  Yes   • Anxiety associated with depression [F41.8]  Yes   • Bipolar 1 disorder (CMS/HCC) [F31.9]  Yes   • Polycythemia [D75.1]  Yes   • Hyperlipidemia [E78.5]  Yes   • Seasonal allergies [J30.2]  Yes   • Chronic pain [G89.29]  Yes   • Insomnia [G47.00]  Yes      Resolved Hospital Problems   No resolved problems to display.       MEDICAL DECISION MAKING COMPLEXITY BY PROBLEM:   Fall with fracture of left humeral neck  -Patient presents with left shoulder pain following a fall approximately 3 feet on 3/7/2021  - x-ray of left shoulder showed an acute fracture of the left humeral neck  -Morphine and ketamine as needed for severe pain, may consider adding Toradol for pain following results of metabolic profile  -Check CBC and BMP  - mL per hour  -N.p.o. at midnight  -Orthopedic surgery consulted in ED     Hypertension  -Well controlled with a blood pressure on admission of 125/74  - Continue atenolol-chlorthalidone  - Monitor while admitted     Polycythemia  -Documented history of polycythemia from [facility], hemoglobin: 16.5  -Monitor CBC while admitted     Hyperlipidemia  -Continue statin     Depression/anxiety/bipolar/insomnia  -Continue trazodone, Lexapro and Ambien  -Restart Lamictal once dose verified     Chronic  pain  -Continue hydrocodone, gabapentin      Seasonal allergies  -Zyrtec and Singulair     Obesity (BMI: 35.36)  -Encouraged on lifestyle modifications                   VTE Prophylaxis -   Mechanical Order History:      Ordered        03/07/21 2036  Place Sequential Compression Device  Once         03/07/21 2036  Maintain Sequential Compression Device  Continuous                 Pharmalogical Order History:     None            Code Status -   Code Status and Medical Interventions:   Ordered at: 03/07/21 2008     Code Status:    CPR     Medical Interventions (Level of Support Prior to Arrest):    Full       This patient has been examined wearing appropriate Personal Protective Equipment and discussed with hospital infection control department. 03/09/21        Discharge Planning  home        Electronically signed by Dillon Barnett MD, 03/09/21, 11:46 EST.  [facility] Hospitalist Team

## 2021-03-09 NOTE — OP NOTE
HUMERUS PROXIMAL OPEN REDUCTION INTERNAL FIXATION  Procedure Report    Patient Name:  Evangelina Levine  YOB: 1965    Date of Surgery:  3/9/2021     Indications: Patient was indicated for open reduction internal fixation of the proximal humerus on the left foot following a fall at home noticed significant pain inability to move the left upper extremity.    Pre-op Diagnosis:   Comminuted fracture of the proximal humerus on the left       Post-Op Diagnosis Codes:  The same    Procedure/CPT® Codes:      Procedure(s):  HUMERUS PROXIMAL OPEN REDUCTION INTERNAL FIXATION    Staff:  Surgeon(s):  Marques Renae MD    Assistant: Jenni Herman    Anesthesia: General  IV Fluids per anesthesia records    IV antibiotic: 2 g of Kefzol    Estimated Blood Loss: 200ml    Tourniquet :  Time: Not used    Pressure: Not used     Findings: Displaced proximal humeral fracture neck with comminution    Specimens: None    Complications: None        Description of Procedure: Patient was properly identified in the holding area PAR-Q held written consent obtained 2 g of Kefzol was given upper extremity block was performed by the anesthesiologist patient was taken to the operating room was administered general anesthesia patient was then put in the beachchair position with all bony prominences well-padded.  Left upper extremity was then prepped and draped in a sterile fashion.  After a standard timeout deltopectoral incision was made starting just medial to the coracoid process extending distally between the deltoid and pectoral.  Skin subcutaneous fascia deep fascia was cut hemostasis was obtained the deltopectoral groove was identified and the cephalic vein was retracted laterally.  Subdeltoid dissection was carried to the bone.  The fracture site was identified and the fracture was reduced with manipulation and checked under fluoroscopic control once this was found to be satisfactory at 3 hole locking plate for  the proximal humerus was placed on the lateral aspect stabilized with a K wire and adjusted to get appropriate placement of screws and once this was found to be satisfactory the K wire was placed in the proximal fragment.  A nonlocking screw was placed in the oblong hole in the distal fragment and plate again adjusted for appropriate location.  Once this was found to be satisfactory it was checked under AP lateral and oblique views of the shoulder.. 4 locking screws were placed in the proximal fragment checking the length under fluoroscopic control and stability of the fragment was also checked.  Once this was found to be appropriate.  2 more nonlocking screws were placed in the distal fragment.  Check x-rays were again obtained in the fixation and stability of the fragment was found to be satisfactory.  The wound was then thoroughly irrigated and closed with a 2-0 Vicryl to the subcutaneous tissue and staples to the skin the wound was cleansed and dressed with Xeroform fluffs ABD and tape patient was then recovered from anesthesia transported to the recovery room in a stable condition    Standard protocol was observed at the beginning of the procedure identifying the patient's site of surgery nature of surgery including allergies.  At the end of the procedure sponge and needle count was found to be correct.    Assistant: Jenni Herman  was responsible for performing the following activities: Retraction, Suction, Irrigation, Closing and Placing Dressing and their skilled assistance was necessary for the success of this case.        Post-Op Plan: She will continue with the sling will be mobilizing her arm with the help of physical therapy can be discharged home or to a rehab facility as the home circumstances decide she will be followed in the outpatient clinic in 10 days time for staple removal.    Marques Renae MD     Date: 3/9/2021    Time: 10:35 EST

## 2021-03-09 NOTE — PROGRESS NOTES
Continued Stay Note  [facility]     Patient Name: Evangelina Levine  MRN: [id number]  Today's Date: 3/9/2021    Admit Date: 3/7/2021    Discharge Plan     Row Name 03/09/21 1455       Plan    Plan  PT/OT eval ordered. Referral to [facility], ordered and pending acceptance.    Plan Comments  Received notice from [facility] they are not in network. Referral to [facility], pending acceptance.     Phone communication or documentation only - no physical contact with patient or family.      Shefali Brooks RN

## 2021-03-09 NOTE — ANESTHESIA PROCEDURE NOTES
Airway  Urgency: elective    Date/Time: 3/9/2021 8:15 AM  Airway not difficult    General Information and Staff    Patient location during procedure: OR  Anesthesiologist: Aldo Lowery MD    Indications and Patient Condition  Indications for airway management: airway protection    Preoxygenated: yes  MILS maintained throughout  Mask difficulty assessment: 1 - vent by mask    Final Airway Details  Final airway type: endotracheal airway      Successful airway: ETT  Cuffed: yes   Successful intubation technique: direct laryngoscopy  Endotracheal tube insertion site: oral  Blade: [place]  Blade size: 4  ETT size (mm): 7.0  Cormack-Lehane Classification: grade IIb - view of arytenoids or posterior of glottis only  Placement verified by: chest auscultation and capnometry   Measured from: gums  ETT/EBT to gums (cm): 23  Number of attempts at approach: 1  Assessment: lips, teeth, and gum same as pre-op and atraumatic intubation

## 2021-03-09 NOTE — PLAN OF CARE
Goal Outcome Evaluation:         Patient is stable and has no complaints of pain. Patient did receive a block during surgery, but is able to move her fingers. Patient likely to discharge home tomorrow with HHC and PT. Will continue to monitor

## 2021-03-09 NOTE — ANESTHESIA PREPROCEDURE EVALUATION
Anesthesia Evaluation     Patient summary reviewed and Nursing notes reviewed   NPO Solid Status: > 6 hours  NPO Liquid Status: > 6 hours           Airway   Mallampati: II  TM distance: >3 FB  Neck ROM: full  No difficulty expected  Dental - normal exam     Pulmonary - negative pulmonary ROS and normal exam    breath sounds clear to auscultation  Cardiovascular - normal exam    ECG reviewed  Rhythm: regular  Rate: normal    (+) hyperlipidemia,       Neuro/Psych  (+) psychiatric history,     GI/Hepatic/Renal/Endo    (+) obesity,       Musculoskeletal (-) negative ROS    Abdominal  - normal exam    Abdomen: soft.  Bowel sounds: normal.   Substance History - negative use     OB/GYN negative ob/gyn ROS         Other - negative ROS                       Anesthesia Plan    ASA 3     general with block     intravenous induction     Anesthetic plan, all risks, benefits, and alternatives have been provided, discussed and informed consent has been obtained with: patient.  Use of blood products discussed with patient .

## 2021-03-10 VITALS
OXYGEN SATURATION: 95 % | RESPIRATION RATE: 16 BRPM | SYSTOLIC BLOOD PRESSURE: 119 MMHG | WEIGHT: 206 LBS | TEMPERATURE: 98.3 F | BODY MASS INDEX: 35.17 KG/M2 | HEART RATE: 67 BPM | DIASTOLIC BLOOD PRESSURE: 56 MMHG | HEIGHT: 64 IN

## 2021-03-10 LAB
ANION GAP SERPL CALCULATED.3IONS-SCNC: 10 MMOL/L (ref 5–15)
BASOPHILS # BLD AUTO: 0 10*3/MM3 (ref 0–0.2)
BASOPHILS NFR BLD AUTO: 0.2 % (ref 0–1.5)
BUN SERPL-MCNC: 8 MG/DL (ref 6–20)
BUN/CREAT SERPL: 17.8 (ref 7–25)
CALCIUM SPEC-SCNC: 8.7 MG/DL (ref 8.6–10.5)
CHLORIDE SERPL-SCNC: 99 MMOL/L (ref 98–107)
CO2 SERPL-SCNC: 30 MMOL/L (ref 22–29)
CREAT SERPL-MCNC: 0.45 MG/DL (ref 0.57–1)
DEPRECATED RDW RBC AUTO: 42.4 FL (ref 37–54)
EOSINOPHIL # BLD AUTO: 0 10*3/MM3 (ref 0–0.4)
EOSINOPHIL NFR BLD AUTO: 0.2 % (ref 0.3–6.2)
ERYTHROCYTE [DISTWIDTH] IN BLOOD BY AUTOMATED COUNT: 13.1 % (ref 12.3–15.4)
GFR SERPL CREATININE-BSD FRML MDRD: 146 ML/MIN/1.73
GLUCOSE SERPL-MCNC: 156 MG/DL (ref 65–99)
HCT VFR BLD AUTO: 38.2 % (ref 34–46.6)
HGB BLD-MCNC: 13.6 G/DL (ref 12–15.9)
LYMPHOCYTES # BLD AUTO: 1.9 10*3/MM3 (ref 0.7–3.1)
LYMPHOCYTES NFR BLD AUTO: 16.8 % (ref 19.6–45.3)
MAGNESIUM SERPL-MCNC: 1.9 MG/DL (ref 1.6–2.6)
MCH RBC QN AUTO: 33 PG (ref 26.6–33)
MCHC RBC AUTO-ENTMCNC: 35.7 G/DL (ref 31.5–35.7)
MCV RBC AUTO: 92.4 FL (ref 79–97)
MONOCYTES # BLD AUTO: 0.9 10*3/MM3 (ref 0.1–0.9)
MONOCYTES NFR BLD AUTO: 8.1 % (ref 5–12)
NEUTROPHILS NFR BLD AUTO: 74.7 % (ref 42.7–76)
NEUTROPHILS NFR BLD AUTO: 8.6 10*3/MM3 (ref 1.7–7)
NRBC BLD AUTO-RTO: 0 /100 WBC (ref 0–0.2)
PLATELET # BLD AUTO: 193 10*3/MM3 (ref 140–450)
PMV BLD AUTO: 7.9 FL (ref 6–12)
POTASSIUM SERPL-SCNC: 3.7 MMOL/L (ref 3.5–5.2)
RBC # BLD AUTO: 4.13 10*6/MM3 (ref 3.77–5.28)
SODIUM SERPL-SCNC: 139 MMOL/L (ref 136–145)
WBC # BLD AUTO: 11.5 10*3/MM3 (ref 3.4–10.8)

## 2021-03-10 PROCEDURE — 97166 OT EVAL MOD COMPLEX 45 MIN: CPT

## 2021-03-10 PROCEDURE — G0378 HOSPITAL OBSERVATION PER HR: HCPCS

## 2021-03-10 PROCEDURE — 99217 PR OBSERVATION CARE DISCHARGE MANAGEMENT: CPT | Performed by: INTERNAL MEDICINE

## 2021-03-10 PROCEDURE — 80048 BASIC METABOLIC PNL TOTAL CA: CPT | Performed by: ORTHOPAEDIC SURGERY

## 2021-03-10 PROCEDURE — 94618 PULMONARY STRESS TESTING: CPT

## 2021-03-10 PROCEDURE — 25010000002 CEFAZOLIN PER 500 MG: Performed by: ORTHOPAEDIC SURGERY

## 2021-03-10 PROCEDURE — 83735 ASSAY OF MAGNESIUM: CPT | Performed by: ORTHOPAEDIC SURGERY

## 2021-03-10 PROCEDURE — 85025 COMPLETE CBC W/AUTO DIFF WBC: CPT | Performed by: ORTHOPAEDIC SURGERY

## 2021-03-10 RX ORDER — OXYCODONE HYDROCHLORIDE 10 MG/1
10 TABLET ORAL EVERY 6 HOURS PRN
Qty: 20 TABLET | Refills: 0 | Status: SHIPPED | OUTPATIENT
Start: 2021-03-10 | End: 2021-03-18

## 2021-03-10 RX ADMIN — ATORVASTATIN CALCIUM 40 MG: 40 TABLET, FILM COATED ORAL at 09:11

## 2021-03-10 RX ADMIN — GABAPENTIN 400 MG: 400 CAPSULE ORAL at 17:25

## 2021-03-10 RX ADMIN — OXYCODONE 10 MG: 5 TABLET ORAL at 17:26

## 2021-03-10 RX ADMIN — GABAPENTIN 400 MG: 400 CAPSULE ORAL at 09:11

## 2021-03-10 RX ADMIN — METOCLOPRAMIDE 10 MG: 10 TABLET ORAL at 09:11

## 2021-03-10 RX ADMIN — CHLORTHALIDONE: 25 TABLET ORAL at 09:10

## 2021-03-10 RX ADMIN — VALACYCLOVIR HYDROCHLORIDE 500 MG: 500 TABLET ORAL at 09:11

## 2021-03-10 RX ADMIN — PANTOPRAZOLE SODIUM 40 MG: 40 TABLET, DELAYED RELEASE ORAL at 06:31

## 2021-03-10 RX ADMIN — OXYCODONE 10 MG: 5 TABLET ORAL at 00:36

## 2021-03-10 RX ADMIN — OXYCODONE 10 MG: 5 TABLET ORAL at 11:21

## 2021-03-10 RX ADMIN — OXYCODONE 10 MG: 5 TABLET ORAL at 06:31

## 2021-03-10 RX ADMIN — CEFAZOLIN SODIUM 2 G: 10 INJECTION, POWDER, FOR SOLUTION INTRAVENOUS at 00:13

## 2021-03-10 RX ADMIN — ESCITALOPRAM OXALATE 20 MG: 10 TABLET ORAL at 09:11

## 2021-03-10 NOTE — DISCHARGE SUMMARY
[facility] Medicine Services  DISCHARGE SUMMARY        Prepared For PCP:  Layla Stephens APRN    Patient Name: Evangelina Levine  : 1965  MRN: [id number]      Date of Admission:   3/7/2021    Date of Discharge:  3/10/2021    Length of stay:  LOS: 0 days     Hospital Course     Presenting Problem:   Fall, initial encounter [W19.XXXA]  Fracture of neck of left humerus, closed, initial encounter [S42.A]      Active Hospital Problems    Diagnosis  POA   • **Fracture of neck of left humerus, closed, initial encounter [S42.212A]  Yes   • Obesity (BMI 30-39.9) [E66.9]  Yes   • Anxiety associated with depression [F41.8]  Yes   • Bipolar 1 disorder (CMS/HCC) [F31.9]  Yes   • Polycythemia [D75.1]  Yes   • Hyperlipidemia [E78.5]  Yes   • Seasonal allergies [J30.2]  Yes   • Chronic pain [G89.29]  Yes   • Insomnia [G47.00]  Yes      Resolved Hospital Problems   No resolved problems to display.     Fall with fracture of left humeral neck  -Patient presents with left shoulder pain following a fall approximately 3 feet on 3/7/2021  - x-ray of left shoulder showed an acute fracture of the left humeral neck  -Morphine and ketamine as needed for severe pain, may consider adding Toradol for pain following results of metabolic profile  -Check CBC and BMP  - mL per hour  -N.p.o. at midnight  -Orthopedic surgery consulted in ED    Hypoxemia  Suspect patient has underlying COPD given long smoking history and chest x-ray findings.  Patient will need outpatient follow-up with PFT   6-minute walk test shows patient needing oxygen    Hypertension  -Well controlled with a blood pressure on admission of 125/74  - Continue atenolol-chlorthalidone  - Monitor while admitted     Polycythemia  -Documented history of polycythemia from [facility], hemoglobin: 16.5  -Monitor CBC while admitted     Hyperlipidemia  -Continue statin     Depression/anxiety/bipolar/insomnia  -Continue trazodone, Lexapro and  Ambien  -Restart Lamictal once dose verified     Chronic pain  -Continue hydrocodone, gabapentin      Seasonal allergies  -Zyrtec and Singulair     Obesity (BMI: 35.36)  -Encouraged on lifestyle modifications         Hospital Course:  Evangelina Levine is a 55 y.o. female       Brief Synopsis of Hospital Course/HPI  Ms. Levine is a 55 y.o. female past medical history of seasonal allergies, polycythemia, obesity, hyperlipidemia, chronic pain, anxiety/depression and bipolar who presents to [facility] complaining of left shoulder pain.  Patient reports that on the afternoon of 3/7/2021 while taking her dog outside she was pulled off balance and fell off in approximately 3 foot retaining wall landing on her left side primarily her left shoulder.  She denies any trauma to her head or loss of consciousness with these events.  When landing patient notes that she knew immediately she had fractured something in her left shoulder.  She states she began to have a sharp pain made worse with movement rated 10/10 at its worst and 2/10 at the time of my exam while lying still and following pain medication.  She notes that since that time she has also begun to experience a sensation of muscle cramping in her shoulder and across the left side of her clavicle.  She denies any other pain including chest pain, dyspnea, cough or fever, nausea or vomiting, changes in bowel or bladder habits, tachycardia or palpitations.  She reports that she smokes approximately 1 pack of cigarettes per day and drinks alcohol only socially.     In the ED x-ray of left shoulder showed an acute fracture of the left humeral neck       Date::    3/8  Co severe pain  had ct humerus  Had norco at home  Switch to percocet     3/9  Just had surgey today  Pulse ox 88%RA  No new co     3/10  Patient oxygenation has improved  She has a saturation of 91 to 92% on room air  6-minute walk test ordered for evaluation for oxygen needs  Patient advised to  stop smoking altogether and follow-up with pulmonary 2 to 4 weeks  She is advised also to use incentive spirometry as tolerated  X-ray shows no clear signs of pneumonia and low suspicion clinically for pneumonia at this time  She has mild hyperglycemia and borderline A1c level and she is made aware of the results and need for avoiding high sweet diet  She had been cleared for discharge by orthopedic surgeon  She was provided with pain medication as well on discharge  She may resume her Lasix on discharge as well        Recommendation for Outpatient Providers:     Defer to activity and use of the left upper extremity to orthopedic surgeon  Wound care as well defer to Ortho  Follow-up with pulmonary service as needed preferably within 1 to 2 months  Follow-up with PCP next week  Continue incentive spirometry at home  Call primary care physician if excessive amount of sputum for unusual color with dark red or green or yellow sputum and excessive amounts or fever or shortness of breath  Stop smoking        Reasons For Change In Medications and Indications for New Medications:        Day of Discharge     HPI:       Vital Signs:   Temp:  [97.5 °F (36.4 °C)-99.3 °F (37.4 °C)] 98.4 °F (36.9 °C)  Heart Rate:  [71-92] 73  Resp:  [15-16] 16  BP: (108-126)/(64-80) 126/77     Physical Exam:  Physical Exam  No wheezing or rales on lung examination today  Awake alert oriented x3 without distress  Cardiac exam shows S1-2 without extra sounds without murmurs  Left upper extremity in a sling.  She has good pulsations and neurologic function of the left upper extremity    Pertinent  and/or Most Recent Results     Results from last 7 days   Lab Units 03/10/21  0319 03/09/21  0214 03/08/21  0526 03/08/21  0332 03/07/21  2058   WBC 10*3/mm3 11.50* 10.10 12.00*  --  12.90*   HEMOGLOBIN g/dL 13.6 14.8 15.2  --  15.9   HEMATOCRIT % 38.2 40.7 43.8  --  45.1   PLATELETS 10*3/mm3 193 208 200  --  243   SODIUM mmol/L 139 138  --  139 138    POTASSIUM mmol/L 3.7 3.6  --  3.5 3.3*   CHLORIDE mmol/L 99 98  --  99 97*   CO2 mmol/L 30.0* 30.0*  --  29.0 23.0   BUN mg/dL 8 7  --  12 11   CREATININE mg/dL 0.45* 0.50*  --  0.64 0.51*   GLUCOSE mg/dL 156* 145*  --  141* 140*   CALCIUM mg/dL 8.7 9.0  --  8.9 9.1     Results from last 7 days   Lab Units 03/09/21  0214 03/08/21  0332   BILIRUBIN mg/dL 0.4  --    ALK PHOS U/L 53  --    ALT (SGPT) U/L 36*  --    AST (SGOT) U/L 24  --    PROTIME Seconds  --  10.9   INR   --  0.99   APTT seconds  --  24.9           Invalid input(s): TG, LDLCALC, LDLREALC        Brief Urine Lab Results  (Last result in the past 365 days)      Color   Clarity   Blood   Leuk Est   Nitrite   Protein   CREAT   Urine HCG        03/08/21 0404 Yellow Clear Negative Negative Negative Negative               Microbiology Results Abnormal     Procedure Component Value - Date/Time    COVID PRE-OP / PRE-PROCEDURE SCREENING ORDER (NO ISOLATION) - Swab, Nasopharynx [[id number]]  (Normal) Collected: 03/07/21 1946    Lab Status: Final result Specimen: Swab from Nasopharynx Updated: 03/08/21 1422    Narrative:      The following orders were created for panel order COVID PRE-OP / PRE-PROCEDURE SCREENING ORDER (NO ISOLATION) - Swab, Nasopharynx.  Procedure                               Abnormality         Status                     ---------                               -----------         ------                     COVID-19,APTIMA PANTHER,...[[id number]]  Normal              Final result                 Please view results for these tests on the individual orders.    COVID-19,APTIMA JAMEHERFRANCA IN-HOUSE, NP/OP SWAB IN UTM/VTM/SALINE TRANSPORT MEDIA,24 HR TAT - Swab, Nasopharynx [[id number]]  (Normal) Collected: 03/07/21 1946    Lab Status: Final result Specimen: Swab from Nasopharynx Updated: 03/08/21 1422     COVID19 Not Detected    Narrative:      Fact sheet for providers: https://www.fda.gov/media/401327/download     Fact sheet for patients:  https://www.fda.gov/media/623418/download    Test performed by RT PCR.    MRSA Screen, PCR (Inpatient) - Swab, Nares [[id number]]  (Normal) Collected: 03/08/21 0201    Lab Status: Final result Specimen: Swab from Nares Updated: 03/08/21 0321     MRSA PCR No MRSA Detected          XR Shoulder 2+ View Left    Result Date: 3/7/2021  Impression: Acute fracture of the left humeral neck.  Electronically Signed By-Alphonse Jasso MD On:3/7/2021 7:01 PM This report was finalized on [id number] by  Alphonse Jasso MD.    XR Chest 1 View    Result Date: 3/8/2021  Impression: Mild bibasilar subsegmental atelectasis.  Electronically Signed By-Mita Graham MD On:3/8/2021 8:06 AM This report was finalized on [id number] by  Mita Graham MD.    CT Upper Extremity Left Without Contrast    Result Date: 3/8/2021  Impression: 1.Comminuted fracture of the proximal humerus which appears to involve the surgical and anatomic necks, and greater tuberosity. 2.There is to be impaction and anterior angulation at the surgical neck fracture site with anterior displacement of the distal humerus by an estimated 5 mm. 3.Calcific tendinopathy of the rotator cuff. 4.Mild glenohumeral and moderate acromioclavicular joint degeneration. Electronically Signed: Nabor Velazquez MD 3/8/2021 13:22 EST                          Test Results Pending at Discharge        Procedures Performed  Procedure(s):  HUMERUS PROXIMAL OPEN REDUCTION INTERNAL FIXATION         Consults:   Consults     Date and Time Order Name Status Description    3/7/2021  7:11 PM Ortho (on-call MD unless specified)      3/7/2021  7:11 PM Hospitalist (on-call MD unless specified) Completed             Discharge Details        Discharge Medications      ASK your doctor about these medications      Instructions Start Date   atenolol-chlorthalidone 100-25 MG per tablet  Commonly known as: TENORETIC   1 tablet, Oral, Daily      atorvastatin 40 MG tablet  Commonly known as: LIPITOR   40 mg,  Oral, Daily      celecoxib 200 MG capsule  Commonly known as: CeleBREX   Oral      escitalopram 20 MG tablet  Commonly known as: LEXAPRO   20 mg, Oral, Daily      furosemide 40 MG tablet  Commonly known as: LASIX   40 mg, Oral, Daily      gabapentin 400 MG capsule  Commonly known as: NEURONTIN   400 mg, Oral, 3 Times Daily      lamoTRIgine 100 MG tablet  Commonly known as: LaMICtal   100 mg, Oral      methocarbamol 500 MG tablet  Commonly known as: ROBAXIN   Oral      metoclopramide 10 MG tablet  Commonly known as: REGLAN   10 mg, Oral, 2 times daily      montelukast 10 MG tablet  Commonly known as: SINGULAIR   10 mg, Oral, Nightly      omeprazole 40 MG capsule  Commonly known as: priLOSEC   40 mg, Oral, Daily      potassium chloride 20 MEQ CR tablet  Commonly known as: K-DUR,KLOR-CON   20 mEq, Oral, Daily      traZODone 100 MG tablet  Commonly known as: DESYREL   100 mg, Oral      valACYclovir 500 MG tablet  Commonly known as: VALTREX   500 mg, Oral, Daily             Allergies   Allergen Reactions   • Sulfa Antibiotics Anaphylaxis         Discharge Disposition:      Diet:  Hospital:  Diet Order   Procedures   • Diet Regular         Discharge Activity:         CODE STATUS:    Code Status and Medical Interventions:   Ordered at: 03/07/21 2008     Code Status:    CPR     Medical Interventions (Level of Support Prior to Arrest):    Full         Follow-up Appointments  No future appointments.    Additional Instructions for the Follow-ups that You Need to Schedule     Ambulatory Referral to Home Health   As directed      Face to Face Visit Date: 3/9/2021    Follow-up provider for Plan of Care?: I treated the patient in an acute care facility and will not continue treatment after discharge.    Follow-up provider: YUSEF MCKEON [[id number]]    Reason/Clinical Findings: post hospital evaluation    Describe mobility limitations that make leaving home difficult: weakness    Nursing/Therapeutic Services Requested: Other ([facility]  to evaluate )    Frequency: 1 Week 1                 Condition on Discharge:      Stable      This patient has been examined wearing appropriate Personal Protective Equipment and discussed with hospital infection control department. 03/10/21      Electronically signed by Dillon Barnett MD, 03/10/21, 11:17 AM EST.      Time: I spent  40 minutes on this discharge activity which included face-to-face encounter with the patient/reviewing the data in the system/coordination of the care with the nursing staff as well as consultants/documentation/entering orders.

## 2021-03-10 NOTE — PROGRESS NOTES
Continued Stay Note  RIAZ Agustin     Patient Name: Evangelina Levine  MRN: [id number]  Today's Date: 3/10/2021    Admit Date: 3/7/2021    Discharge Plan     Row Name 03/10/21 1132       Plan    Plan  D/C Plan: Home with [facility] (accepted, order placed).    Plan Comments   spoke to patient at bedside wearing mask and goggles and keeping distance greater than 6 feet and spent less than 15 minutes in room. CM notified patient that [facility] has accepted-patient states she does not want home health at this time. CM disucssed home health with patient along with bedside RN-patient continues to decline. Patient states her daughter and niece can assist her at home. CM notified [facility] liaison that patient declined. EMIGDIO notified MD of patient declining home health-per MD note patient has follow-up appt on 3/16. CM called and spoke to patient again regarding home health-patient states she is now agreeable-CM notified MD/[facility] liaison.          Expected Discharge Date and Time     Expected Discharge Date Expected Discharge Time    Mar 10, 2021             Dori Jones

## 2021-03-10 NOTE — DISCHARGE INSTRUCTIONS
Defer to activity and use of the left upper extremity to orthopedic surgeon  Wound care as well defer to Ortho  Follow-up with pulmonary service as needed preferably within 1 to 2 months  Follow-up with PCP next week  Continue incentive spirometry at home  Call primary care physician if excessive amount of sputum for unusual color with dark red or green or yellow sputum and excessive amounts or fever or shortness of breath  Stop smoking

## 2021-03-10 NOTE — PLAN OF CARE
Goal Outcome Evaluation:  Plan of Care Reviewed With: patient     Outcome Summary: Pt is a 56 y/o F admitted for fall down steps and is now POD 1 s/p L humerus ORIF. Prior to admission, pt independent with all ADLs, independent with func mob w/o use of AD, takes care of  who had a CVA, drives, goes to pain mgmt. pt was independent this date with func mob, toilet transfers and dressing tech with leonel tech. 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 Pt stating that she wants to get home for husbands birthday today, and slightly agitated. Pt stating that tomorrow she will be driving herself to pain mgmt clinic as she does not have people to assist her and she is the one that has to take care of other people. pt is NWB in LUE wearing sling. rec home with HHOT for safety . Pt to be seen daily per MD orders for pendulum swings starting POD 1 5-6x/day on L shoulder. PPE worn: eye goggles, mask, gloves.

## 2021-03-10 NOTE — PROGRESS NOTES
Exercise Oximetry    Patient Name:Evangelina Levine   MRN: [id number]   Date: 03/10/21             ROOM AIR BASELINE   SpO2% 93   Heart Rate    Blood Pressure      EXERCISE ON ROOM AIR SpO2% EXERCISE ON O2 @  LPM SpO2%   1 MINUTE 92 1 MINUTE    2 MINUTES 90 2 MINUTES    3 MINUTES 89 3 MINUTES    4 MINUTES 85 4 MINUTES  Put on 2 liters 93   5 MINUTES  5 MINUTES 94   6 MINUTES  6 MINUTES 93              Distance Walked   Distance Walked   Dyspnea (Rajesh Scale)   Dyspnea (Rajesh Scale)   Fatigue (Rajesh Scale)   Fatigue (Rajesh Scale)   SpO2% Post Exercise  94 SpO2% Post Exercise    HR Post Exercise   Time to Recovery  Time to Recovery     Comments:

## 2021-03-10 NOTE — PROGRESS NOTES
LOS: 0 days   Patient Care Team:  Layla Stephens APRN as PCP - General (Nurse Practitioner)    Chief Complaint: Minimal pain discomfort left shoulder    Subjective     Interval History:     Patient Complaints: Minimal pain discomfort left shoulder  Patient Denies: Denies any chest pain  History taken from: patient    Review of Systems:   ROS    Objective     Vital Signs  Temp:  [97 °F (36.1 °C)-99.3 °F (37.4 °C)] 98.1 °F (36.7 °C)  Heart Rate:  [69-92] 71  Resp:  [15-20] 15  BP: (108-147)/(47-80) 122/64    Physical Exam:     General Appearance:    Alert, cooperative, in no acute distress   Ears:    Ears appear intact with no abnormalities noted   Throat:   No oral lesions, no thrush, oral mucosa moist   Neck:   No adenopathy, supple, trachea midline, no thyromegaly, no   carotid bruit, no JVD   Lungs:     Clear to auscultation,respirations regular, even and                  unlabored   Chest Wall:    No abnormalities observed   Abdomen:     Normal bowel sounds, no masses, no organomegaly, soft        non-tender, non-distended, no guarding, no rebound                tenderness   Rectal:     Deferred   Pulses:   Pulses palpable and equal bilaterally   Skin:   No bleeding, bruising or rash   Lymph nodes:   No palpable adenopathy   Neurologic:  Sensation of the left upper extremity is normal for radial median ulnar nerves.  Sensation of the axillary nerve is intact.       Local exam: The dressing from the left shoulder was removed.  Wound clean and dry without any drainage.  Fresh dressing was applied.  Movements are still painful.  Elbow movements wrist movements are normal.   strength is normal.      Results Review:      Lab Results (last 24 hours)     Procedure Component Value Units Date/Time    Basic Metabolic Panel [[id number]]  (Abnormal) Collected: 03/10/21 0319    Specimen: Blood Updated: 03/10/21 0419     Glucose 156 mg/dL      BUN 8 mg/dL      Creatinine 0.45 mg/dL      Sodium 139 mmol/L       Potassium 3.7 mmol/L      Chloride 99 mmol/L      CO2 30.0 mmol/L      Calcium 8.7 mg/dL      eGFR Non African Amer 146 mL/min/1.73      BUN/Creatinine Ratio 17.8     Anion Gap 10.0 mmol/L     Narrative:      GFR Normal >60  Chronic Kidney Disease <60  Kidney Failure <15      Magnesium [[id number]]  (Normal) Collected: 03/10/21 0319    Specimen: Blood Updated: 03/10/21 0419     Magnesium 1.9 mg/dL     CBC & Differential [[id number]]  (Abnormal) Collected: 03/10/21 0319    Specimen: Blood Updated: 03/10/21 0359    Narrative:      The following orders were created for panel order CBC & Differential.  Procedure                               Abnormality         Status                     ---------                               -----------         ------                     CBC Auto Differential[[id number]]        Abnormal            Final result                 Please view results for these tests on the individual orders.    CBC Auto Differential [[id number]]  (Abnormal) Collected: 03/10/21 0319    Specimen: Blood Updated: 03/10/21 0359     WBC 11.50 10*3/mm3      RBC 4.13 10*6/mm3      Hemoglobin 13.6 g/dL      Hematocrit 38.2 %      MCV 92.4 fL      MCH 33.0 pg      MCHC 35.7 g/dL      RDW 13.1 %      RDW-SD 42.4 fl      MPV 7.9 fL      Platelets 193 10*3/mm3      Neutrophil % 74.7 %      Lymphocyte % 16.8 %      Monocyte % 8.1 %      Eosinophil % 0.2 %      Basophil % 0.2 %      Neutrophils, Absolute 8.60 10*3/mm3      Lymphocytes, Absolute 1.90 10*3/mm3      Monocytes, Absolute 0.90 10*3/mm3      Eosinophils, Absolute 0.00 10*3/mm3      Basophils, Absolute 0.00 10*3/mm3      nRBC 0.0 /100 WBC           Imaging Results (Last 24 Hours)     ** No results found for the last 24 hours. **          I reviewed the patient's new clinical results.  I reviewed the patient's new imaging results and agree with the interpretation.    Medication Review: Scheduled Meds:atenolol-chlorthalidone (TENORETIC) 100-25 combo dose, , Oral,  Q24H  atorvastatin, 40 mg, Oral, Daily  enoxaparin, 40 mg, Subcutaneous, Q24H  escitalopram, 20 mg, Oral, Daily  gabapentin, 400 mg, Oral, TID  metoclopramide, 10 mg, Oral, BID  montelukast, 10 mg, Oral, Nightly  nicotine, 1 patch, Transdermal, Q24H  pantoprazole, 40 mg, Oral, QAM  sodium chloride, 10 mL, Intravenous, Q12H  valACYclovir, 500 mg, Oral, Daily      Continuous Infusions:   PRN Meds:.•  acetaminophen **OR** acetaminophen **OR** acetaminophen  •  Calcium Gluconate-NaCl **AND** calcium gluconate **AND** Calcium, Ionized  •  ketamine (KETALAR) IVPB **AND** Ketamine Vital Signs & Assessment  •  magnesium sulfate **OR** magnesium sulfate **OR** magnesium sulfate  •  melatonin  •  Morphine  •  ondansetron **OR** ondansetron  •  oxyCODONE  •  potassium & sodium phosphates **OR** potassium & sodium phosphates  •  potassium chloride  •  potassium chloride  •  [COMPLETED] Insert peripheral IV **AND** sodium chloride  •  sodium chloride  •  traZODone      Assessment/Plan       Fracture of neck of left humerus, closed, initial encounter    Obesity (BMI 30-39.9)    Anxiety associated with depression    Bipolar 1 disorder (CMS/HCC)    Polycythemia    Hyperlipidemia    Seasonal allergies    Chronic pain    Insomnia      Postop day 1 from open reduction internal fixation of proximal humerus left.    Plan for disposition: Discharge home with home health and physical therapy.  Follow-up in outpatient clinic for 1 week time appointment has been made and the appointment card given to the patient.  Appointment is for 16 March at 2:00 at [facility]. [facility]  [employer] [facility]    Marques Renae MD  03/10/21  08:07 EST

## 2021-03-10 NOTE — DISCHARGE PLACEMENT REQUEST
"Som Cardenas (55 y.o. Female)     Date of Birth Social Security Number Address Home Phone MRN    1965  [address] [address] [phone] [id number]    Tenriism Marital Status          Orthodox        Admission Date Admission Type Admitting Provider Attending Provider Department, Room/Bed    3/7/21 Emergency Dillon Barnett MD Gad, George Fayez Labib Youssief, MD [facility] SURGICAL INPATIENT, 4123/1    Discharge Date Discharge Disposition Discharge Destination         Home-Health Care [facility]              Attending Provider: Dillon Barnett MD    Allergies: Sulfa Antibiotics    Isolation: None   Infection: None   Code Status: CPR    Ht: 162.6 cm (64\")   Wt: 93.4 kg (206 lb)    Admission Cmt: None   Principal Problem: Fracture of neck of left humerus, closed, initial encounter [S42.212A]                 Active Insurance as of 3/7/2021     Primary Coverage     Payor Plan Insurance Group Employer/Plan Group    [facility] MEDICARE REPLACEMENT [employer]IONS MEDICARE REPLACEMENT [id number]     Payor Plan Address Payor Plan Phone Number Payor Plan Fax Number Effective Dates    [facility] [phone]  1/1/2020 - None Entered    [address]       [facility] [address]       Subscriber Name Subscriber Birth Date Member ID       SOM CARDENAS 1965 [id number]                 Emergency Contacts      (Rel.) Home Phone Work Phone Mobile Phone    Alfredo Cardenas (Spouse) -- -- [phone]               Respiratory Therapy Notes (last 24 hours) (Notes from 03/09/21 1359 through 03/10/21 1359)      Layla Major at 03/10/21 1346          Exercise Oximetry    Patient Name:Som Cardenas   MRN: [id number]   Date: 03/10/21             ROOM AIR BASELINE   SpO2% 93   Heart Rate    Blood Pressure      EXERCISE ON ROOM AIR SpO2% EXERCISE ON O2 @  LPM SpO2%   1 MINUTE 92 1 MINUTE    2 MINUTES 90 2 MINUTES  "   3 MINUTES 89 3 MINUTES    4 MINUTES 85 4 MINUTES  Put on 2 liters 93   5 MINUTES  5 MINUTES 94   6 MINUTES  6 MINUTES 93              Distance Walked   Distance Walked   Dyspnea (Rajesh Scale)   Dyspnea (Rajesh Scale)   Fatigue (Rajesh Scale)   Fatigue (Rajesh Scale)   SpO2% Post Exercise  94 SpO2% Post Exercise    HR Post Exercise   Time to Recovery  Time to Recovery     Comments:     Electronically signed by Layla Major at 03/10/21 1349         Oxygen Therapy [EQ60] (Order [id number])  Order  Date: 3/10/2021 Department: [facility] SURGICAL INPATIENT Ordering/Authorizing: Dillon Barnett MD   Order History  Outpatient  Date/Time Action Taken User Additional Information   03/10/21 1529 Sign Dori Jones Ordering Mode: Verbal with readback   Order Details    Frequency Duration Priority Order Class   None None Routine External   Start Date/Time    Start Date   03/10/21   Order Information    Order Date Service Start Date Start Time   03/10/21 Medicine 03/10/21    Comments    Bleed into CPAP at night          Reference Links    Associated Reports   View Encounter   Order Questions    Question Answer Comment   Delivery Modality Nasal Cannula    Liters Per Minute: 2    Duration: Continuous    Equipment  Oxygen Concentrator &  &  Portable Gaseous Oxygen System & Portable Oxygen Contents Gaseous &  Conserving Regulator    Length of Need (99 Months = Lifetime) 99 Months = Lifetime    Note:  Custom values to enter length of need for DME          Verbal Order Info    Action Created on Order Mode Entered by Responsible Provider Signed by Signed on   Ordering 03/10/21 1529 Verbal with readback Dori Jones George Fayez Labib Youssief, MD     Source Order Set / Preference List    Preference List   AMB SUPPLIES [1416]   Clinical Indications     ICD-10-CM ICD-9-CM   Chronic obstructive pulmonary disease, unspecified COPD type (CMS/formerly [facility])     J44.9 496   Reprint Order  Requisition    Oxygen Therapy (Order #[id number]) on 3/10/21   Encounter    View Encounter          Order Provider Info        Office phone Pager E-mail   Ordering User Dori Jones -- -- --   Authorizing Provider Dillon Barnett [phone] -- --   Attending Provider Dillon Barnett [phone] -- --   Entered By Dori Jones -- -- --   Ordering Provider Dillon Barnett [phone] -- --   Linked Charges    No charges linked to this procedure   Tracking Reports  Cosign Tracking Order Transmittal Tracking   Authorized by:  Dillon Barnett MD  (NPI: [id number])       Lab Component SmartPhrase Guide    Oxygen Therapy (Order #[id number]) on 3/10/21

## 2021-03-10 NOTE — PLAN OF CARE
Pt resting. Pt complaining of pain, treated per MAR. Pt ambulatory with stand by assist. Vitals stable. Home with home health when appropriate. Will continue to monitor.    Problem: Adult Inpatient Plan of Care  Goal: Plan of Care Review  Outcome: Ongoing, Progressing  Flowsheets (Taken 3/10/2021 0126)  Progress: improving  Plan of Care Reviewed With: patient

## 2021-03-10 NOTE — THERAPY EVALUATION
Patient Name: Evangelina Levine  : 1965    MRN: [id number]                              Today's Date: 3/10/2021       Admit Date: 3/7/2021    Visit Dx:     ICD-10-CM ICD-9-CM   1. Fracture of neck of left humerus, closed, initial encounter  S42.212A 812.01   2. Fall, initial encounter  W19.XXXA E888.9     Patient Active Problem List   Diagnosis   • Fracture of neck of left humerus, closed, initial encounter   • Obesity (BMI 30-39.9)   • Anxiety associated with depression   • Bipolar 1 disorder (CMS/HCC)   • Polycythemia   • Hyperlipidemia   • Seasonal allergies   • Chronic pain   • Insomnia     History reviewed. No pertinent past medical history.  Past Surgical History:   Procedure Laterality Date   • CHOLECYSTECTOMY     • COLONOSCOPY     • ENDOSCOPY     • HYSTERECTOMY     • ORIF TIBIA/FIBULA FRACTURES Left      General Information     Row Name 03/10/21 0826          OT Time and Intention    Document Type  evaluation  -NA     Mode of Treatment  occupational therapy  -NA     Row Name 03/10/21 0826          General Information    Patient Profile Reviewed  yes  -NA     Prior Level of Function  independent:;ADL's;transfer;all household mobility;home management;feeding;grooming;dressing;bathing;cooking;cleaning  -NA     Existing Precautions/Restrictions  fall;non-weight bearing;left  -NA     Barriers to Rehab  none identified  -NA     Row Name 03/10/21 0826          Living Environment    Lives With  spouse  -NA     Row Name 03/10/21 0826          Cognition    Orientation Status (Cognition)  oriented x 4  -NA       User Key  (r) = Recorded By, (t) = Taken By, (c) = Cosigned By    Initials Name Provider Type    NA Zenaida Cintron OT Occupational Therapist          Mobility/ADL's     Row Name 03/10/21 0907 03/10/21 0827       Bed Mobility    Bed Mobility  --  bed mobility (all) activities  -NA    All Activities, Rhea (Bed Mobility)  modified independence  -NA  --    Assistive Device (Bed Mobility)  bed  rails  -NA  --    Comment (Bed Mobility)  increased time 2/2 pain with one rest break  -NA  --    Row Name 03/10/21 0907 03/10/21 0827       Transfers    Transfers  --  sit-stand transfer;toilet transfer  -NA    Sit-Stand Lawrence (Transfers)  independent  -NA  --    Lawrence Level (Toilet Transfer)  independent  -NA  --    Row Name 03/10/21 0907          Toilet Transfer    Type (Toilet Transfer)  sit-stand;stand-sit  -NA     Row Name 03/10/21 0907          Functional Mobility    Functional Mobility- Ind. Level  independent  -NA     [facility] Name 03/10/21 0907          Activities of Daily Living    BADL Assessment/Intervention  upper body dressing;lower body dressing;grooming;toileting  -NA     [facility] Name 03/10/21 0827          Mobility    Extremity Weight-bearing Status  left upper extremity  -NA     Left Upper Extremity (Weight-bearing Status)  non weight-bearing (NWB)  -NA     [facility] Name 03/10/21 0907          Upper Body Dressing Assessment/Training    Lawrence Level (Upper Body Dressing)  upper body dressing skills;doff;don;front opening garment;minimum assist (75% patient effort)  -NA     Comment (Upper Body Dressing)  leonel tech in standing  -NA     [facility] Name 03/10/21 0907          Lower Body Dressing Assessment/Training    Comment (Lower Body Dressing)  s/u donning slippers EOB  -NA     [facility] Name 03/10/21 0907          Grooming Assessment/Training    Lawrence Level (Grooming)  grooming skills;hair care, combing/brushing;oral care regimen;wash face, hands  -NA     Comment (Grooming)  s/u standing at sink  -NA     [facility] Name 03/10/21 0907          Toileting Assessment/Training    Lawrence Level (Toileting)  toileting skills;perform perineal hygiene;supervision  -NA       User Key  (r) = Recorded By, (t) = Taken By, (c) = Cosigned By    Initials Name Provider Type    NA Zenaida Cintron OT Occupational Therapist        Obj/Interventions     Row Name 03/10/21 0909          Sensory Assessment  (Somatosensory)    Sensory Assessment (Somatosensory)  sensation intact  -NA     [facility] Name 03/10/21 0909          Vision Assessment/Intervention    Visual Impairment/Limitations  WFL  -NA     [facility] Name 03/10/21 0909          Range of Motion Comprehensive    Comment, General Range of Motion  LUE NT; RUE WFL  -NA     [facility] Name 03/10/21 0909          Strength Comprehensive (MMT)    Comment, General Manual Muscle Testing (MMT) Assessment  LUE NT; RUE WFL  -NA     [facility] Name 03/10/21 0925          Shoulder (Therapeutic Exercise)    Shoulder (Therapeutic Exercise)  pendulum exercises  -NA     Shoulder Pendulum Exercises (Therapeutic Exercise)  standing;15 seconds duration;left 3 sets  -NA     [facility] Name 03/10/21 0909          Balance    Static Sitting Balance  WNL  -NA     Static Standing Balance  WNL  -NA     [facility] Name 03/10/21 0925          Therapeutic Exercise    Therapeutic Exercise  shoulder  -NA       User Key  (r) = Recorded By, (t) = Taken By, (c) = Cosigned By    Initials Name Provider Type    NA Zenaida Cintron, OT Occupational Therapist        Goals/Plan    No documentation.       Clinical Impression     Row Name 03/10/21 0914          Pain Assessment    Additional Documentation  Pain Scale: Numbers Pre/Post-Treatment (Group)  -NA     Row Name 03/10/21 0914          Pain Scale: Numbers Pre/Post-Treatment    Pretreatment Pain Rating  5/10  -NA     Posttreatment Pain Rating  8/10  -NA     Pain Location - Side  Left  -NA     Pain Location - Orientation  upper  -NA     Pain Location  extremity  -NA     Pain Intervention(s)  Emotional support  -NA     [facility] Name 03/10/21 0914          Plan of Care Review    Plan of Care Reviewed With  patient  -NA     Outcome Summary      Row Name 03/10/21 0914          Therapy Assessment/Plan (OT)    Patient/Family Therapy Goal Statement (OT)  open to [facility], however does not think she needs it.  -NA     Rehab Potential (OT)  good, to achieve stated therapy goals  -NA     Criteria for Skilled  Therapeutic Interventions Met (OT)  yes;meets criteria;skilled treatment is necessary  -NA     Therapy Frequency (OT)  evaluation only  -NA     Row Name 03/10/21 0914          Vital Signs    O2 Delivery Pre Treatment  room air  -NA     O2 Delivery Intra Treatment  room air  -NA     O2 Delivery Post Treatment  room air  -NA     Pre Patient Position  Supine  -NA     Intra Patient Position  Standing  -NA     Post Patient Position  Supine  -NA     Row Name 03/10/21 0914          Positioning and Restraints    Pre-Treatment Position  in bed  -NA     Post Treatment Position  bed  -NA     In Bed  notified nsg;supine;call light within reach;encouraged to call for assist;side rails up x2  -NA       User Key  (r) = Recorded By, (t) = Taken By, (c) = Cosigned By    Initials Name Provider Type    Zenaida Ji OT Occupational Therapist        Outcome Measures     Row Name 03/10/21 0827          How much help from another is currently needed...    Putting on and taking off regular lower body clothing?  3  -NA     Bathing (including washing, rinsing, and drying)  3  -NA     Toileting (which includes using toilet bed pan or urinal)  3  -NA     Putting on and taking off regular upper body clothing  4  -NA     Taking care of personal grooming (such as brushing teeth)  4  -NA     Eating meals  4  -NA     AM-PAC 6 Clicks Score (OT)  21  -NA     Row Name 03/10/21 0827          Functional Assessment    Outcome Measure Options  AM-PAC 6 Clicks Daily Activity (OT)  -NA       User Key  (r) = Recorded By, (t) = Taken By, (c) = Cosigned By    Initials Name Provider Type    Zenaida Ji OT Occupational Therapist        Occupational Therapy Education                 Title: PT OT SLP Therapies (Done)     Topic: Occupational Therapy (Done)     Point: ADL training (Done)     Description:   Instruct learner(s) on proper safety adaptation and remediation techniques during self care or transfers.   Instruct in proper use of assistive  devices.              Learning Progress Summary           Patient Acceptance, E,TB,D, VU,DU by NA at 3/10/2021 0917    Comment: role of OT, DC rec, using call light, LUE precautions, wearing sling, donning/doffing sling.                   Point: Home exercise program (Done)     Description:   Instruct learner(s) on appropriate technique for monitoring, assisting and/or progressing therapeutic exercises/activities.              Learning Progress Summary           Patient Acceptance, E,TB,D, VU,DU by NA at 3/10/2021 0917    Comment: role of OT, DC rec, using call light, LUE precautions, wearing sling, donning/doffing sling.                   Point: Precautions (Done)     Description:   Instruct learner(s) on prescribed precautions during self-care and functional transfers.              Learning Progress Summary           Patient Acceptance, E,TB,D, VU,DU by NA at 3/10/2021 0917    Comment: role of OT, DC rec, using call light, LUE precautions, wearing sling, donning/doffing sling.                   Point: Body mechanics (Done)     Description:   Instruct learner(s) on proper positioning and spine alignment during self-care, functional mobility activities and/or exercises.              Learning Progress Summary           Patient Acceptance, E,TB,D, VU,DU by NA at 3/10/2021 0917    Comment: role of OT, DC rec, using call light, LUE precautions, wearing sling, donning/doffing sling.                               User Key     Initials Effective Dates Name Provider Type Discipline    ELIE 09/30/20 -  Zenaida Cintron OT Occupational Therapist OT              OT Recommendation and Plan  Planned Therapy Interventions (OT): activity tolerance training, functional balance retraining, neuromuscular control/coordination retraining, occupation/activity based interventions, patient/caregiver education/training, ROM/therapeutic exercise, strengthening exercise, transfer/mobility retraining  Therapy Frequency (OT): evaluation  only  Plan of Care Review  Plan of Care Reviewed With: patient  Outcome Summary: Pt is a 54 y/o F admitted for fall down steps and is now POD 1 s/p L humerus ORIF. Prior to admission, pt independent with all ADLs, independent with func mob w/o use of AD, takes care of  who had a CVA, drives, goes to pain mgmt. pt was independent this date with func mob, toilet transfers and dressing tech with leonel tech. pt is NWB in LUE wearing sling. rec HHOT for safety . Pt to be seen daily per MD orders for pendulum swings starting POD 1 5-6x/day on L shoulder. PPE worn: eye goggles, mask, gloves.     Time Calculation:   Time Calculation- OT     Row Name 03/10/21 0825             Time Calculation- OT    OT Start Time  0825  -NA      OT Stop Time  0838  -NA      OT Time Calculation (min)  13 min  -NA    Pt seen for additional 12 min for pendulum swings from 910-922      Total Timed Code Minutes- OT  0 minute(s)  -NA      OT Non-Billable Time (min)  25 min  -NA total      OT Received On  03/10/21  -NA        User Key  (r) = Recorded By, (t) = Taken By, (c) = Cosigned By    Initials Name Provider Type    NA Zenaida Cintron OT Occupational Therapist        Therapy Charges for Today     Code Description Service Date Service Provider Modifiers Qty    [id number]  OT EVAL MOD COMPLEXITY 4 3/10/2021 Zenaida Cintron OT GO 1               Zenaida Cintron OT  3/10/2021

## 2021-03-10 NOTE — PROGRESS NOTES
Continued Stay Note  RIAZ Velez     Patient Name: Evangelina Levine  MRN: [id number]  Today's Date: 3/10/2021    Admit Date: 3/7/2021    Discharge Plan     Row Name 03/10/21 1534       Plan    Plan  D/C Plan: Home with [facility] (accepted, order placed). Home oxygen with [facility] (order placed).    Plan Comments  Cm called and spoke to patient on phone-patient states she has CPAP through [facility] and wants to use them for home oxygen. CM sent referral via Epic and notified Josselin with [facility]. WB will deliver tank to hospital around 5pm-notified bedside RN.          Expected Discharge Date and Time     Expected Discharge Date Expected Discharge Time    Mar 10, 2021             Dori Jones

## 2021-03-11 LAB — QT INTERVAL: 413 MS

## 2021-03-11 NOTE — PROGRESS NOTES
Case Management Discharge Note      Final Note: Home with [facility]    Provided Post Acute Provider List?: Yes  Post Acute Provider List: Home Health  Provided Post Acute Provider Quality & Resource List?: N/A  Delivered To: Patient  Method of Delivery: Telephone    Selected Continued Care - Discharged on 3/10/2021 Admission date: 3/7/2021 - Discharge disposition: Home-Health Care Svc                 Home Medical Care Coordination complete    Service Provider Selected Services Address Phone Fax Patient Preferred    [facility] [place]  Home Health Services [address] [place] [address] [phone] --              Final Discharge Disposition Code: 06 - home with home health care

## 2021-03-11 NOTE — PAYOR COMM NOTE
"DC Notice/Requesting IP Determination for Ron Levine DO 1965  Policy no. [id number]    DC 3/10/2021 to home with Home Health Services.    AUTHORIZATION PENDING 3/07-3/10/2021  PLEASE FORWARD DETERMINATION TO FOLLOWING CONTACT:    [facility] AL[facility] BONILLA [facility]    [phone]    Som Levine (55 y.o. Female)     Date of Birth Social Security Number Address Home Phone MRN    1965  [id number] [place] [facility] [phone] [id number]    Islam Marital Status          Caodaism        Admission Date Admission Type Admitting Provider Attending Provider Department, Room/Bed    3/7/21 Emergency Dillon Barnett MD  [facility] SURGICAL INPATIENT, 4123/1    Discharge Date Discharge Disposition Discharge Destination        3/10/2021 Home-Health Care Svc              Attending Provider: (none)   Allergies: Sulfa Antibiotics    Isolation: None   Infection: None   Code Status: Prior    Ht: 162.6 cm (64\")   Wt: 93.4 kg (206 lb)    Admission Cmt: None   Principal Problem: Fracture of neck of left humerus, closed, initial encounter [S42.212A]                 Active Insurance as of 3/7/2021     Primary Coverage     Payor Plan Insurance Group Employer/Plan Group    [facility] MEDICARE REPLACEMENT [employer] MEDICARE REPLACEMENT [id number]     Payor Plan Address Payor Plan Phone Number Payor Plan Fax Number Effective Dates    [facility] [phone]  1/1/2020 - None Entered    [address]       [facility] [id number]       Subscriber Name Subscriber Birth Date Member ID       SOM LEVINE 1965 [id number]                 Emergency Contacts      (Rel.) Home Phone Work Phone Mobile Phone    Alfredo Levine (Spouse) -- -- [phone]               Discharge Summary      Dillon Barnett MD at 03/10/21 1117                [facility] Medicine Services  DISCHARGE " SUMMARY        Prepared For PCP:  Layla Stephens APRN    Patient Name: Evangelina Levine  : 1965  MRN: [id number]      Date of Admission:   3/7/2021    Date of Discharge:  3/10/2021    Length of stay:  LOS: 0 days     Hospital Course     Presenting Problem:   Fall, initial encounter [W19.XXXA]  Fracture of neck of left humerus, closed, initial encounter [S42.212A]      Active Hospital Problems    Diagnosis  POA   • **Fracture of neck of left humerus, closed, initial encounter [S42.212A]  Yes   • Obesity (BMI 30-39.9) [E66.9]  Yes   • Anxiety associated with depression [F41.8]  Yes   • Bipolar 1 disorder (CMS/HCC) [F31.9]  Yes   • Polycythemia [D75.1]  Yes   • Hyperlipidemia [E78.5]  Yes   • Seasonal allergies [J30.2]  Yes   • Chronic pain [G89.29]  Yes   • Insomnia [G47.00]  Yes      Resolved Hospital Problems   No resolved problems to display.     Fall with fracture of left humeral neck  -Patient presents with left shoulder pain following a fall approximately 3 feet on 3/7/2021  - x-ray of left shoulder showed an acute fracture of the left humeral neck  -Morphine and ketamine as needed for severe pain, may consider adding Toradol for pain following results of metabolic profile  -Check CBC and BMP  - mL per hour  -N.p.o. at midnight  -Orthopedic surgery consulted in ED    Hypoxemia  Suspect patient has underlying COPD given long smoking history and chest x-ray findings.  Patient will need outpatient follow-up with PFT   6-minute walk test shows patient needing oxygen    Hypertension  -Well controlled with a blood pressure on admission of 125/74  - Continue atenolol-chlorthalidone  - Monitor while admitted     Polycythemia  -Documented history of polycythemia from [facility], hemoglobin: 16.5  -Monitor CBC while admitted     Hyperlipidemia  -Continue statin     Depression/anxiety/bipolar/insomnia  -Continue trazodone, Lexapro and Ambien  -Restart Lamictal once dose verified     Chronic  pain  -Continue hydrocodone, gabapentin      Seasonal allergies  -Zyrtec and Singulair     Obesity (BMI: 35.36)  -Encouraged on lifestyle modifications         Hospital Course:  Evangelina Levine is a 55 y.o. female       Brief Synopsis of Hospital Course/HPI  Ms. Levine is a 55 y.o. female past medical history of seasonal allergies, polycythemia, obesity, hyperlipidemia, chronic pain, anxiety/depression and bipolar who presents to [facility] complaining of left shoulder pain.  Patient reports that on the afternoon of 3/7/2021 while taking her dog outside she was pulled off balance and fell off in approximately 3 foot retaining wall landing on her left side primarily her left shoulder.  She denies any trauma to her head or loss of consciousness with these events.  When landing patient notes that she knew immediately she had fractured something in her left shoulder.  She states she began to have a sharp pain made worse with movement rated 10/10 at its worst and 2/10 at the time of my exam while lying still and following pain medication.  She notes that since that time she has also begun to experience a sensation of muscle cramping in her shoulder and across the left side of her clavicle.  She denies any other pain including chest pain, dyspnea, cough or fever, nausea or vomiting, changes in bowel or bladder habits, tachycardia or palpitations.  She reports that she smokes approximately 1 pack of cigarettes per day and drinks alcohol only socially.     In the ED x-ray of left shoulder showed an acute fracture of the left humeral neck       Date::    3/8  Co severe pain  had ct humerus  Had norco at home  Switch to percocet     3/9  Just had surgey today  Pulse ox 88%RA  No new co     3/10  Patient oxygenation has improved  She has a saturation of 91 to 92% on room air  6-minute walk test ordered for evaluation for oxygen needs  Patient advised to stop smoking altogether and follow-up with pulmonary 2 to 4  weeks  She is advised also to use incentive spirometry as tolerated  X-ray shows no clear signs of pneumonia and low suspicion clinically for pneumonia at this time  She has mild hyperglycemia and borderline A1c level and she is made aware of the results and need for avoiding high sweet diet  She had been cleared for discharge by orthopedic surgeon  She was provided with pain medication as well on discharge  She may resume her Lasix on discharge as well        Recommendation for Outpatient Providers:     Defer to activity and use of the left upper extremity to orthopedic surgeon  Wound care as well defer to Ortho  Follow-up with pulmonary service as needed preferably within 1 to 2 months  Follow-up with PCP next week  Continue incentive spirometry at home  Call primary care physician if excessive amount of sputum for unusual color with dark red or green or yellow sputum and excessive amounts or fever or shortness of breath  Stop smoking        Reasons For Change In Medications and Indications for New Medications:        Day of Discharge     HPI:       Vital Signs:   Temp:  [97.5 °F (36.4 °C)-99.3 °F (37.4 °C)] 98.4 °F (36.9 °C)  Heart Rate:  [71-92] 73  Resp:  [15-16] 16  BP: (108-126)/(64-80) 126/77     Physical Exam:  Physical Exam  No wheezing or rales on lung examination today  Awake alert oriented x3 without distress  Cardiac exam shows S1-2 without extra sounds without murmurs  Left upper extremity in a sling.  She has good pulsations and neurologic function of the left upper extremity    Pertinent  and/or Most Recent Results     Results from last 7 days   Lab Units 03/10/21  0319 03/09/21  0214 03/08/21  0526 03/08/21  0332 03/07/21  2058   WBC 10*3/mm3 11.50* 10.10 12.00*  --  12.90*   HEMOGLOBIN g/dL 13.6 14.8 15.2  --  15.9   HEMATOCRIT % 38.2 40.7 43.8  --  45.1   PLATELETS 10*3/mm3 193 208 200  --  243   SODIUM mmol/L 139 138  --  139 138   POTASSIUM mmol/L 3.7 3.6  --  3.5 3.3*   CHLORIDE mmol/L 99 98   --  99 97*   CO2 mmol/L 30.0* 30.0*  --  29.0 23.0   BUN mg/dL 8 7  --  12 11   CREATININE mg/dL 0.45* 0.50*  --  0.64 0.51*   GLUCOSE mg/dL 156* 145*  --  141* 140*   CALCIUM mg/dL 8.7 9.0  --  8.9 9.1     Results from last 7 days   Lab Units 03/09/21  0214 03/08/21  0332   BILIRUBIN mg/dL 0.4  --    ALK PHOS U/L 53  --    ALT (SGPT) U/L 36*  --    AST (SGOT) U/L 24  --    PROTIME Seconds  --  10.9   INR   --  0.99   APTT seconds  --  24.9           Invalid input(s): TG, LDLCALC, LDLREALC        Brief Urine Lab Results  (Last result in the past 365 days)      Color   Clarity   Blood   Leuk Est   Nitrite   Protein   CREAT   Urine HCG        03/08/21 0404 Yellow Clear Negative Negative Negative Negative               Microbiology Results Abnormal     Procedure Component Value - Date/Time    COVID PRE-OP / PRE-PROCEDURE SCREENING ORDER (NO ISOLATION) - Swab, Nasopharynx [[id number]]  (Normal) Collected: 03/07/21 1946    Lab Status: Final result Specimen: Swab from Nasopharynx Updated: 03/08/21 1422    Narrative:      The following orders were created for panel order COVID PRE-OP / PRE-PROCEDURE SCREENING ORDER (NO ISOLATION) - Swab, Nasopharynx.  Procedure                               Abnormality         Status                     ---------                               -----------         ------                     COVID-19,APTIMA PANTHER,...[[id number]]  Normal              Final result                 Please view results for these tests on the individual orders.    COVID-19,APTIMA PANTHER,[facility] IN-HOUSE, NP/OP SWAB IN UTM/VTM/SALINE TRANSPORT MEDIA,24 HR TAT - Swab, Nasopharynx [[id number]]  (Normal) Collected: 03/07/21 1946    Lab Status: Final result Specimen: Swab from Nasopharynx Updated: 03/08/21 1422     COVID19 Not Detected    Narrative:      Fact sheet for providers: https://www.fda.gov/media/693724/download     Fact sheet for patients: https://www.fda.gov/media/769592/download    Test performed by RT PCR.     MRSA Screen, PCR (Inpatient) - Swab, Nares [[id number]]  (Normal) Collected: 03/08/21 0201    Lab Status: Final result Specimen: Swab from Nares Updated: 03/08/21 0321     MRSA PCR No MRSA Detected          XR Shoulder 2+ View Left    Result Date: 3/7/2021  Impression: Acute fracture of the left humeral neck.  Electronically Signed By-Alphonse Jasso MD On:3/7/2021 7:01 PM This report was finalized on [id number] by  Alphonse Jasso MD.    XR Chest 1 View    Result Date: 3/8/2021  Impression: Mild bibasilar subsegmental atelectasis.  Electronically Signed By-Mita Graham MD On:3/8/2021 8:06 AM This report was finalized on [id number] by  Mita Graham MD.    CT Upper Extremity Left Without Contrast    Result Date: 3/8/2021  Impression: 1.Comminuted fracture of the proximal humerus which appears to involve the surgical and anatomic necks, and greater tuberosity. 2.There is to be impaction and anterior angulation at the surgical neck fracture site with anterior displacement of the distal humerus by an estimated 5 mm. 3.Calcific tendinopathy of the rotator cuff. 4.Mild glenohumeral and moderate acromioclavicular joint degeneration. Electronically Signed: Nabor Velazquez MD 3/8/2021 13:22 EST                          Test Results Pending at Discharge        Procedures Performed  Procedure(s):  HUMERUS PROXIMAL OPEN REDUCTION INTERNAL FIXATION         Consults:   Consults     Date and Time Order Name Status Description    3/7/2021  7:11 PM Ortho (on-call MD unless specified)      3/7/2021  7:11 PM Hospitalist (on-call MD unless specified) Completed             Discharge Details        Discharge Medications      ASK your doctor about these medications      Instructions Start Date   atenolol-chlorthalidone 100-25 MG per tablet  Commonly known as: TENORETIC   1 tablet, Oral, Daily      atorvastatin 40 MG tablet  Commonly known as: LIPITOR   40 mg, Oral, Daily      celecoxib 200 MG capsule  Commonly known as: CeleBREX    Oral      escitalopram 20 MG tablet  Commonly known as: LEXAPRO   20 mg, Oral, Daily      furosemide 40 MG tablet  Commonly known as: LASIX   40 mg, Oral, Daily      gabapentin 400 MG capsule  Commonly known as: NEURONTIN   400 mg, Oral, 3 Times Daily      lamoTRIgine 100 MG tablet  Commonly known as: LaMICtal   100 mg, Oral      methocarbamol 500 MG tablet  Commonly known as: ROBAXIN   Oral      metoclopramide 10 MG tablet  Commonly known as: REGLAN   10 mg, Oral, 2 times daily      montelukast 10 MG tablet  Commonly known as: SINGULAIR   10 mg, Oral, Nightly      omeprazole 40 MG capsule  Commonly known as: priLOSEC   40 mg, Oral, Daily      potassium chloride 20 MEQ CR tablet  Commonly known as: K-DUR,KLOR-CON   20 mEq, Oral, Daily      traZODone 100 MG tablet  Commonly known as: DESYREL   100 mg, Oral      valACYclovir 500 MG tablet  Commonly known as: VALTREX   500 mg, Oral, Daily             Allergies   Allergen Reactions   • Sulfa Antibiotics Anaphylaxis         Discharge Disposition:      Diet:  Hospital:  Diet Order   Procedures   • Diet Regular         Discharge Activity:         CODE STATUS:    Code Status and Medical Interventions:   Ordered at: 03/07/21 2008     Code Status:    CPR     Medical Interventions (Level of Support Prior to Arrest):    Full         Follow-up Appointments  No future appointments.    Additional Instructions for the Follow-ups that You Need to Schedule     Ambulatory Referral to Home Health   As directed      Face to Face Visit Date: 3/9/2021    Follow-up provider for Plan of Care?: I treated the patient in an acute care facility and will not continue treatment after discharge.    Follow-up provider: YUSEF MCKEON [[id number]]    Reason/Clinical Findings: post hospital evaluation    Describe mobility limitations that make leaving home difficult: weakness    Nursing/Therapeutic Services Requested: Other ([facility] to evaluate )    Frequency: 1 Week 1                 Condition on  Discharge:      Stable      This patient has been examined wearing appropriate Personal Protective Equipment and discussed with hospital infection control department. 03/10/21      Electronically signed by Dillon Barnett MD, 03/10/21, 11:17 AM EST.      Time: I spent  40 minutes on this discharge activity which included face-to-face encounter with the patient/reviewing the data in the system/coordination of the care with the nursing staff as well as consultants/documentation/entering orders.      Electronically signed by Dillon Barnett MD at 03/10/21 2732

## 2021-03-11 NOTE — PAYOR COMM NOTE
"Requesting Obs Auth for Ron Levine  1965  Policy no. [id number]  DC to home 3/10/2021 with Home Health Services.    Please disregard prior request for INPATIENT Auth/As request is for OBSERVATION AUTH.    OBSERVATION AUTHORIZATION PENDING  PLEASE FORWARD DETERMINATION TO FOLLOWING CONTACT:    [facility]      [phone]      Som Levine (55 y.o. Female)     Date of Birth Social Security Number Address Home Phone MRN    1965  [address] [place] [address] [phone] [id number]    Gnosticism Marital Status          Mosque        Admission Date Admission Type Admitting Provider Attending Provider Department, Room/Bed    3/7/21 Emergency Dillon Barnett MD  [facility]IENT,     Discharge Date Discharge Disposition Discharge Destination        3/10/2021 Home-Health Care Svc              Attending Provider: (none)   Allergies: Sulfa Antibiotics    Isolation: None   Infection: None   Code Status: Prior    Ht: 162.6 cm (64\")   Wt: 93.4 kg (206 lb)    Admission Cmt: None   Principal Problem: Fracture of neck of left humerus, closed, initial encounter [S42.212A]                 Active Insurance as of 3/7/2021     Primary Coverage     Payor Plan Insurance Group Employer/Plan Group    [facility] MEDICARE REPLACEMENT [employer]IONS MEDICARE REPLACEMENT [id number]     Payor Plan Address Payor Plan Phone Number Payor Plan Fax Number Effective Dates    [facility] [phone]  2020 - None Entered    [address]       [facility] [address]       Subscriber Name Subscriber Birth Date Member ID       SOM LEVINE 1965 [id number]                 Emergency Contacts      (Rel.) Home Phone Work Phone Mobile Phone    Alfredo Levine (Spouse) -- -- [phone]               Discharge Summary      Dillon Barnett MD at 03/10/21 1117      "           [facility]  DISCHARGE SUMMARY        Prepared For PCP:  Layla Stephens APRN    Patient Name: Evangelina Levine  : 1965  MRN: [id number]      Date of Admission:   3/7/2021    Date of Discharge:  3/10/2021    Length of stay:  LOS: 0 days     Hospital Course     Presenting Problem:   Fall, initial encounter [W19.XXXA]  Fracture of neck of left humerus, closed, initial encounter [S42.A]      Active Hospital Problems    Diagnosis  POA   • **Fracture of neck of left humerus, closed, initial encounter [S42.212A]  Yes   • Obesity (BMI 30-39.9) [E66.9]  Yes   • Anxiety associated with depression [F41.8]  Yes   • Bipolar 1 disorder (CMS/HCC) [F31.9]  Yes   • Polycythemia [D75.1]  Yes   • Hyperlipidemia [E78.5]  Yes   • Seasonal allergies [J30.2]  Yes   • Chronic pain [G89.29]  Yes   • Insomnia [G47.00]  Yes      Resolved Hospital Problems   No resolved problems to display.     Fall with fracture of left humeral neck  -Patient presents with left shoulder pain following a fall approximately 3 feet on 3/7/2021  - x-ray of left shoulder showed an acute fracture of the left humeral neck  -Morphine and ketamine as needed for severe pain, may consider adding Toradol for pain following results of metabolic profile  -Check CBC and BMP  - mL per hour  -N.p.o. at midnight  -Orthopedic surgery consulted in ED    Hypoxemia  Suspect patient has underlying COPD given long smoking history and chest x-ray findings.  Patient will need outpatient follow-up with PFT   6-minute walk test shows patient needing oxygen    Hypertension  -Well controlled with a blood pressure on admission of 125/74  - Continue atenolol-chlorthalidone  - Monitor while admitted     Polycythemia  -Documented history of polycythemia from [facility], hemoglobin: 16.5  -Monitor CBC while admitted     Hyperlipidemia  -Continue statin     Depression/anxiety/bipolar/insomnia  -Continue trazodone, Lexapro  and Ambien  -Restart Lamictal once dose verified     Chronic pain  -Continue hydrocodone, gabapentin      Seasonal allergies  -Zyrtec and Singulair     Obesity (BMI: 35.36)  -Encouraged on lifestyle modifications         Hospital Course:  Evangelina Levine is a 55 y.o. female       Brief Synopsis of Hospital Course/HPI  Ms. Levine is a 55 y.o. female past medical history of seasonal allergies, polycythemia, obesity, hyperlipidemia, chronic pain, anxiety/depression and bipolar who presents to [facility] complaining of left shoulder pain.  Patient reports that on the afternoon of 3/7/2021 while taking her dog outside she was pulled off balance and fell off in approximately 3 foot retaining wall landing on her left side primarily her left shoulder.  She denies any trauma to her head or loss of consciousness with these events.  When landing patient notes that she knew immediately she had fractured something in her left shoulder.  She states she began to have a sharp pain made worse with movement rated 10/10 at its worst and 2/10 at the time of my exam while lying still and following pain medication.  She notes that since that time she has also begun to experience a sensation of muscle cramping in her shoulder and across the left side of her clavicle.  She denies any other pain including chest pain, dyspnea, cough or fever, nausea or vomiting, changes in bowel or bladder habits, tachycardia or palpitations.  She reports that she smokes approximately 1 pack of cigarettes per day and drinks alcohol only socially.     In the ED x-ray of left shoulder showed an acute fracture of the left humeral neck       Date::    3/8  Co severe pain  had ct humerus  Had norco at home  Switch to percocet     3/9  Just had surgey today  Pulse ox 88%RA  No new co     3/10  Patient oxygenation has improved  She has a saturation of 91 to 92% on room air  6-minute walk test ordered for evaluation for oxygen needs  Patient advised  to stop smoking altogether and follow-up with pulmonary 2 to 4 weeks  She is advised also to use incentive spirometry as tolerated  X-ray shows no clear signs of pneumonia and low suspicion clinically for pneumonia at this time  She has mild hyperglycemia and borderline A1c level and she is made aware of the results and need for avoiding high sweet diet  She had been cleared for discharge by orthopedic surgeon  She was provided with pain medication as well on discharge  She may resume her Lasix on discharge as well        Recommendation for Outpatient Providers:     Defer to activity and use of the left upper extremity to orthopedic surgeon  Wound care as well defer to Ortho  Follow-up with pulmonary service as needed preferably within 1 to 2 months  Follow-up with PCP next week  Continue incentive spirometry at home  Call primary care physician if excessive amount of sputum for unusual color with dark red or green or yellow sputum and excessive amounts or fever or shortness of breath  Stop smoking        Reasons For Change In Medications and Indications for New Medications:        Day of Discharge     HPI:       Vital Signs:   Temp:  [97.5 °F (36.4 °C)-99.3 °F (37.4 °C)] 98.4 °F (36.9 °C)  Heart Rate:  [71-92] 73  Resp:  [15-16] 16  BP: (108-126)/(64-80) 126/77     Physical Exam:  Physical Exam  No wheezing or rales on lung examination today  Awake alert oriented x3 without distress  Cardiac exam shows S1-2 without extra sounds without murmurs  Left upper extremity in a sling.  She has good pulsations and neurologic function of the left upper extremity    Pertinent  and/or Most Recent Results     Results from last 7 days   Lab Units 03/10/21  0319 03/09/21  0214 03/08/21  0526 03/08/21  0332 03/07/21  2058   WBC 10*3/mm3 11.50* 10.10 12.00*  --  12.90*   HEMOGLOBIN g/dL 13.6 14.8 15.2  --  15.9   HEMATOCRIT % 38.2 40.7 43.8  --  45.1   PLATELETS 10*3/mm3 193 208 200  --  243   SODIUM mmol/L 139 138  --  139 138    POTASSIUM mmol/L 3.7 3.6  --  3.5 3.3*   CHLORIDE mmol/L 99 98  --  99 97*   CO2 mmol/L 30.0* 30.0*  --  29.0 23.0   BUN mg/dL 8 7  --  12 11   CREATININE mg/dL 0.45* 0.50*  --  0.64 0.51*   GLUCOSE mg/dL 156* 145*  --  141* 140*   CALCIUM mg/dL 8.7 9.0  --  8.9 9.1     Results from last 7 days   Lab Units 03/09/21  0214 03/08/21  0332   BILIRUBIN mg/dL 0.4  --    ALK PHOS U/L 53  --    ALT (SGPT) U/L 36*  --    AST (SGOT) U/L 24  --    PROTIME Seconds  --  10.9   INR   --  0.99   APTT seconds  --  24.9           Invalid input(s): TG, LDLCALC, LDLREALC        Brief Urine Lab Results  (Last result in the past 365 days)      Color   Clarity   Blood   Leuk Est   Nitrite   Protein   CREAT   Urine HCG        03/08/21 0404 Yellow Clear Negative Negative Negative Negative               Microbiology Results Abnormal     Procedure Component Value - Date/Time    COVID PRE-OP / PRE-PROCEDURE SCREENING ORDER (NO ISOLATION) - Swab, Nasopharynx [[id number]]  (Normal) Collected: 03/07/21 1946    Lab Status: Final result Specimen: Swab from Nasopharynx Updated: 03/08/21 1422    Narrative:      The following orders were created for panel order COVID PRE-OP / PRE-PROCEDURE SCREENING ORDER (NO ISOLATION) - Swab, Nasopharynx.  Procedure                               Abnormality         Status                     ---------                               -----------         ------                     COVID-19,APTIMA PANTHER,...[[id number]]  Normal              Final result                 Please view results for these tests on the individual orders.    COVID-19,APTIMA JAMEHERFRANCA IN-HOUSE, NP/OP SWAB IN UTM/VTM/SALINE TRANSPORT MEDIA,24 HR TAT - Swab, Nasopharynx [[id number]]  (Normal) Collected: 03/07/21 1946    Lab Status: Final result Specimen: Swab from Nasopharynx Updated: 03/08/21 1422     COVID19 Not Detected    Narrative:      Fact sheet for providers: https://www.fda.gov/media/712472/download     Fact sheet for patients:  https://www.fda.gov/media/026664/download    Test performed by RT PCR.    MRSA Screen, PCR (Inpatient) - Swab, Nares [[id number]]  (Normal) Collected: 03/08/21 0201    Lab Status: Final result Specimen: Swab from Nares Updated: 03/08/21 0321     MRSA PCR No MRSA Detected          XR Shoulder 2+ View Left    Result Date: 3/7/2021  Impression: Acute fracture of the left humeral neck.  Electronically Signed By-Alphonse Jasso MD On:3/7/2021 7:01 PM This report was finalized on [id number] by  Alphonse Jasso MD.    XR Chest 1 View    Result Date: 3/8/2021  Impression: Mild bibasilar subsegmental atelectasis.  Electronically Signed By-Mita Graham MD On:3/8/2021 8:06 AM This report was finalized on [id number] by  Mita Graham MD.    CT Upper Extremity Left Without Contrast    Result Date: 3/8/2021  Impression: 1.Comminuted fracture of the proximal humerus which appears to involve the surgical and anatomic necks, and greater tuberosity. 2.There is to be impaction and anterior angulation at the surgical neck fracture site with anterior displacement of the distal humerus by an estimated 5 mm. 3.Calcific tendinopathy of the rotator cuff. 4.Mild glenohumeral and moderate acromioclavicular joint degeneration. Electronically Signed: Nabor Velazquez MD 3/8/2021 13:22 EST                          Test Results Pending at Discharge        Procedures Performed  Procedure(s):  HUMERUS PROXIMAL OPEN REDUCTION INTERNAL FIXATION         Consults:   Consults     Date and Time Order Name Status Description    3/7/2021  7:11 PM Ortho (on-call MD unless specified)      3/7/2021  7:11 PM Hospitalist (on-call MD unless specified) Completed             Discharge Details        Discharge Medications      ASK your doctor about these medications      Instructions Start Date   atenolol-chlorthalidone 100-25 MG per tablet  Commonly known as: TENORETIC   1 tablet, Oral, Daily      atorvastatin 40 MG tablet  Commonly known as: LIPITOR   40 mg,  Oral, Daily      celecoxib 200 MG capsule  Commonly known as: CeleBREX   Oral      escitalopram 20 MG tablet  Commonly known as: LEXAPRO   20 mg, Oral, Daily      furosemide 40 MG tablet  Commonly known as: LASIX   40 mg, Oral, Daily      gabapentin 400 MG capsule  Commonly known as: NEURONTIN   400 mg, Oral, 3 Times Daily      lamoTRIgine 100 MG tablet  Commonly known as: LaMICtal   100 mg, Oral      methocarbamol 500 MG tablet  Commonly known as: ROBAXIN   Oral      metoclopramide 10 MG tablet  Commonly known as: REGLAN   10 mg, Oral, 2 times daily      montelukast 10 MG tablet  Commonly known as: SINGULAIR   10 mg, Oral, Nightly      omeprazole 40 MG capsule  Commonly known as: priLOSEC   40 mg, Oral, Daily      potassium chloride 20 MEQ CR tablet  Commonly known as: K-DUR,KLOR-CON   20 mEq, Oral, Daily      traZODone 100 MG tablet  Commonly known as: DESYREL   100 mg, Oral      valACYclovir 500 MG tablet  Commonly known as: VALTREX   500 mg, Oral, Daily             Allergies   Allergen Reactions   • Sulfa Antibiotics Anaphylaxis         Discharge Disposition:      Diet:  Hospital:  Diet Order   Procedures   • Diet Regular         Discharge Activity:         CODE STATUS:    Code Status and Medical Interventions:   Ordered at: 03/07/21 2008     Code Status:    CPR     Medical Interventions (Level of Support Prior to Arrest):    Full         Follow-up Appointments  No future appointments.    Additional Instructions for the Follow-ups that You Need to Schedule     Ambulatory Referral to Home Health   As directed      Face to Face Visit Date: 3/9/2021    Follow-up provider for Plan of Care?: I treated the patient in an acute care facility and will not continue treatment after discharge.    Follow-up provider: YUSEF MCKEON [[id number]]    Reason/Clinical Findings: post hospital evaluation    Describe mobility limitations that make leaving home difficult: weakness    Nursing/Therapeutic Services Requested: Other ([facility]  to evaluate )    Frequency: 1 Week 1                 Condition on Discharge:      Stable      This patient has been examined wearing appropriate Personal Protective Equipment and discussed with hospital infection control department. 03/10/21      Electronically signed by Dillon Barnett MD, 03/10/21, 11:17 AM EST.      Time: I spent  40 minutes on this discharge activity which included face-to-face encounter with the patient/reviewing the data in the system/coordination of the care with the nursing staff as well as consultants/documentation/entering orders.      Electronically signed by Dillon Barnett MD at 03/10/21 5760

## 2021-03-12 PROBLEM — J44.9 COPD (CHRONIC OBSTRUCTIVE PULMONARY DISEASE) (HCC): Status: ACTIVE | Noted: 2021-03-12

## 2021-04-17 ENCOUNTER — TRANSCRIBE ORDERS (OUTPATIENT)
Dept: ADMINISTRATIVE | Facility: HOSPITAL | Age: 56
End: 2021-04-17

## 2021-04-23 ENCOUNTER — TRANSCRIBE ORDERS (OUTPATIENT)
Dept: ADMINISTRATIVE | Facility: HOSPITAL | Age: 56
End: 2021-04-23

## 2021-04-23 ENCOUNTER — HOSPITAL ENCOUNTER (OUTPATIENT)
Dept: GENERAL RADIOLOGY | Facility: HOSPITAL | Age: 56
Discharge: HOME | End: 2021-04-23
Admitting: ORTHOPAEDIC SURGERY

## 2021-04-23 DIAGNOSIS — S42.002A FRACTURE OF UNSPECIFIED PART OF LEFT CLAVICLE, INITIAL ENCOUNTER FOR CLOSED FRACTURE: ICD-10-CM

## 2021-04-23 DIAGNOSIS — S42.002A FRACTURE OF UNSPECIFIED PART OF LEFT CLAVICLE, INITIAL ENCOUNTER FOR CLOSED FRACTURE: Primary | ICD-10-CM

## 2021-04-23 PROCEDURE — 73030 X-RAY EXAM OF SHOULDER: CPT

## 2021-06-19 ENCOUNTER — HOSPITAL ENCOUNTER (OUTPATIENT)
Dept: GENERAL RADIOLOGY | Facility: HOSPITAL | Age: 56
Discharge: HOME | End: 2021-06-19
Admitting: ORTHOPAEDIC SURGERY

## 2021-06-19 ENCOUNTER — TRANSCRIBE ORDERS (OUTPATIENT)
Dept: ADMINISTRATIVE | Facility: HOSPITAL | Age: 56
End: 2021-06-19

## 2021-06-19 DIAGNOSIS — S42.292G CLOSED 3-PART FRACTURE OF PROXIMAL HUMERUS WITH DELAYED HEALING, LEFT: ICD-10-CM

## 2021-06-19 DIAGNOSIS — S42.292G CLOSED 3-PART FRACTURE OF PROXIMAL HUMERUS WITH DELAYED HEALING, LEFT: Primary | ICD-10-CM

## 2021-06-19 PROCEDURE — 73030 X-RAY EXAM OF SHOULDER: CPT

## 2021-06-29 ENCOUNTER — TREATMENT (OUTPATIENT)
Dept: PHYSICAL THERAPY | Facility: CLINIC | Age: 56
End: 2021-06-29

## 2021-06-29 DIAGNOSIS — S42.292A HUMERAL HEAD FRACTURE, LEFT, CLOSED, INITIAL ENCOUNTER: Primary | ICD-10-CM

## 2021-06-29 PROCEDURE — 97110 THERAPEUTIC EXERCISES: CPT | Performed by: PHYSICAL THERAPIST

## 2021-06-29 PROCEDURE — 97161 PT EVAL LOW COMPLEX 20 MIN: CPT | Performed by: PHYSICAL THERAPIST

## 2021-06-29 NOTE — PROGRESS NOTES
Physical Therapy Initial Evaluation and Plan of Care    Patient: Evangelina Levine   : 1965  Diagnosis/ICD-10 Code:  Humeral head fracture, left, closed, initial encounter [S42.292A]  Referring practitioner: Marques Renae MD  Date of Initial Visit: 2021  Today's Date: 2021  Patient seen for 1 sessions           Subjective Questionnaire: quick DASH 52%      Subjective 56 yo female with L humeral head fx. Pt fractures her humerus after falling 3/7/21. Pt had HH therapy, DC'd ~2 weeks ago. Primary c/o is limited ROM and strength. 2/10 pain now and 8/10 at worst. Symptoms worsen with reaching and lifting activities. Symptoms improve with rest. Reports no restrictions. Denies numbness and tingling. Still having episodes of UE swelling. L UE dominant.   Social hx: On disability  MD follow up:  ~2 months      Objective          Active Range of Motion   Left Shoulder   Flexion: 95 degrees with pain  Abduction: 95 degrees with pain  External rotation 0°: 25 degrees   Internal rotation BTB: sacrum with pain    Passive Range of Motion   Left Shoulder   Flexion: 100 degrees   Abduction: 100 degrees   External rotation 0°: 40 degrees   Internal rotation 45°: 30 degrees     Strength/Myotome Testing     Left Shoulder     Planes of Motion   Flexion: 3+   Abduction: 3-   External rotation at 0°: 3+   Internal rotation at 0°: 3     Right Shoulder   Normal muscle strength          Assessment & Plan     Assessment  Impairments: abnormal coordination, abnormal or restricted ROM, activity intolerance, impaired physical strength, lacks appropriate home exercise program and pain with function  Assessment details: 56 yo female with L humeral head fx. Pt is with a good response to treatment this date and would benefit from further evaluation and treatment to address the above impairments.    Prognosis: good  Functional Limitations: carrying objects, lifting, sleeping, pulling, pushing, uncomfortable because of pain,  reaching behind back, reaching overhead and unable to perform repetitive tasks  Goals  Plan Goals: Short term goals, 1 week: Tolerate HEP progression.  Voice compliance with activity modification.  Report improvement in symptoms.    LTGs, 5 weeks: Improve quick DASH score to 30%.  AROM to be at or near wfl.  4+/5 strength throughout.  Independent with HEP.    Plan  Therapy options: will be seen for skilled physical therapy services  Other planned modality interventions: modalities prn  Planned therapy interventions: flexibility, functional ROM exercises, home exercise program, manual therapy, neuromuscular re-education, strengthening, stretching and therapeutic activities  Frequency: 1-2 times per week.  Duration in visits: 10  Treatment plan discussed with: patient      Timed:         Manual Therapy:         mins  30207;     Therapeutic Exercise:    30     mins  61373;     Neuromuscular Barbie:        mins  64254;    Therapeutic Activity:          mins  84838;     Gait Training:           mins  90634;     Ultrasound:          mins  91685;    Ionto                                   mins   24009  Self Care                            mins   91055    Un-Timed:  Electrical Stimulation:         mins  48102 ( );  Traction          mins 77711  Low Eval     15     Mins  93697  Mod Eval          Mins  23204  High Eval                            Mins  73870  Re-Eval                               mins  57277        Timed Treatment:   30   mins   Total Treatment:     45   mins    PT SIGNATURE: Todd Saunders PT, DPT, OCS  [place] license: [id number]  DATE TREATMENT INITIATED: 6/29/2021    Initial Certification  Certification Period: 9/27/2021  I certify that the therapy services are furnished while this patient is under my care.  The services outlined above are required for this patient and will be reviewed every 90 days.     PHYSICIAN: _____________________________    Marques Renae MD      DATE:     Please sign and return  via fax to [phone]. Thank you, [facility].

## 2021-07-01 ENCOUNTER — TREATMENT (OUTPATIENT)
Dept: PHYSICAL THERAPY | Facility: CLINIC | Age: 56
End: 2021-07-01

## 2021-07-01 DIAGNOSIS — S42.292A HUMERAL HEAD FRACTURE, LEFT, CLOSED, INITIAL ENCOUNTER: Primary | ICD-10-CM

## 2021-07-01 PROCEDURE — 97112 NEUROMUSCULAR REEDUCATION: CPT | Performed by: PHYSICAL THERAPIST

## 2021-07-01 PROCEDURE — 97110 THERAPEUTIC EXERCISES: CPT | Performed by: PHYSICAL THERAPIST

## 2021-07-01 NOTE — PROGRESS NOTES
Physical Therapy Daily Progress Note  Evangelina Levine   1965   Primary Diagnosis: Humeral head fracture, left, closed, initial encounter [S42.292A]   Type: THERAPY  Noted: 6/29/2021 7/1/2021   Visits: 2       Subjective   Pt reports: Stiff and sore today. HEP going well.      Objective     See Exercise, Manual, and Modality Logs for complete treatment.     Patient Education: HEP    Assessment/Plan Flexion ROM grossly improved. End range pain.      Progress per Plan of Care            Timed:         Manual Therapy:         mins  65378;     Therapeutic Exercise:    30     mins  41600;     Neuromuscular Barbie:    10    mins  69955;    Therapeutic Activity:          mins  36139;     Gait Training:           mins  76943;     Ultrasound:          mins  87946;    Ionto                                   mins   93380  Self Care                            mins   02306    Un-Timed:  Electrical Stimulation:         mins  08074 ( );  Traction          mins 46320  Low Eval          Mins  91471  Mod Eval          Mins  96028  High Eval                            Mins  89343  Re-Eval                               mins  72582    Timed Treatment:   40   mins   Total Treatment:     40   mins    Todd Saunders, PT, DPT, OCS  [place] license: [id number]  Physical Therapist

## 2021-07-08 ENCOUNTER — TREATMENT (OUTPATIENT)
Dept: PHYSICAL THERAPY | Facility: CLINIC | Age: 56
End: 2021-07-08

## 2021-07-08 DIAGNOSIS — S42.292A HUMERAL HEAD FRACTURE, LEFT, CLOSED, INITIAL ENCOUNTER: Primary | ICD-10-CM

## 2021-07-08 PROCEDURE — G0283 ELEC STIM OTHER THAN WOUND: HCPCS | Performed by: PHYSICAL THERAPIST

## 2021-07-08 PROCEDURE — 97110 THERAPEUTIC EXERCISES: CPT | Performed by: PHYSICAL THERAPIST

## 2021-07-08 NOTE — PROGRESS NOTES
Physical Therapy Daily Progress Note  Evangelina Levine   1965   Primary Diagnosis: Humeral head fracture, left, closed, initial encounter [S42.292A]   Type: THERAPY  Noted: 6/29/2021 7/8/2021   Visits: 3       Subjective   Pt reports: More stiffness and soreness lately. Per pt, MD wants to try estim. Pt has home TENs.      Objective     See Exercise, Manual, and Modality Logs for complete treatment.     Patient Education: HEP    Assessment/Plan Decreased pain with IFC today. Pt to bring home TENS unit next visit for instruction. Rx limited by c/o pain today.      Progress per Plan of Care            Timed:         Manual Therapy:         mins  36629;     Therapeutic Exercise:    25     mins  25575;     Neuromuscular Barbie:        mins  96947;    Therapeutic Activity:          mins  76844;     Gait Training:           mins  43147;     Ultrasound:          mins  23269;    Ionto                                   mins   82842  Self Care                            mins   68573    Un-Timed:  Electrical Stimulation:   15      mins  44483 ( );  Traction          mins 46673  Low Eval          Mins  24066  Mod Eval          Mins  69359  High Eval                            Mins  63641  Re-Eval                               mins  84581    Timed Treatment:   25   mins   Total Treatment:     40   mins    Todd Saunders, PT, DPT, OCS  [place] license: [id number]  Physical Therapist

## 2021-07-15 ENCOUNTER — TREATMENT (OUTPATIENT)
Dept: PHYSICAL THERAPY | Facility: CLINIC | Age: 56
End: 2021-07-15

## 2021-07-15 DIAGNOSIS — S42.292A HUMERAL HEAD FRACTURE, LEFT, CLOSED, INITIAL ENCOUNTER: Primary | ICD-10-CM

## 2021-07-15 PROCEDURE — 97140 MANUAL THERAPY 1/> REGIONS: CPT | Performed by: PHYSICAL THERAPIST

## 2021-07-15 PROCEDURE — 97110 THERAPEUTIC EXERCISES: CPT | Performed by: PHYSICAL THERAPIST

## 2021-07-15 NOTE — PROGRESS NOTES
Physical Therapy Daily Progress Note    VISIT#: 4    Subjective   Evangelina Levine reports that she is stiff when she first starts to move her arm with improved mobility the longer duration of use.   Pain Rating (0/10): 5    Objective     See Exercise, Manual, and Modality Logs for complete treatment.     Assessment/Plan   Pt continues to progress ROM and strengthening activities with rest breaks taken throughout the session due to fatigue and pain. Manual therapy PROM and posterior and inferior glides performed today with visual improvements in ROM noted. UBE added today with good tolerance overall    Plan  Progress per Plan of Care and Progress strengthening /stabilization /functional activity            Timed:         Manual Therapy:    10     mins  83456;     Therapeutic Exercise:    30     mins  30122;     Neuromuscular Barbie:        mins  67096;    Therapeutic Activity:          mins  90501;     Gait Training:           mins  91830;     Ultrasound:          mins  74994;    Ionto                                   mins   60776  Self Care                            mins   06958    Un-Timed:  Electrical Stimulation:         mins  43568 ( );  Dry Needling          mins self-pay  Traction          mins 57362  Low Eval          Mins  78664  Mod Eval          Mins  47826  High Eval                            Mins  35531  Re-Eval                               mins  37308    Timed Treatment:   40   mins   Total Treatment:     40   mins    Katina Toussaint PT, DPT  Physical Therapist

## 2021-07-20 ENCOUNTER — TREATMENT (OUTPATIENT)
Dept: PHYSICAL THERAPY | Facility: CLINIC | Age: 56
End: 2021-07-20

## 2021-07-20 DIAGNOSIS — S42.292A HUMERAL HEAD FRACTURE, LEFT, CLOSED, INITIAL ENCOUNTER: Primary | ICD-10-CM

## 2021-07-20 PROCEDURE — 97112 NEUROMUSCULAR REEDUCATION: CPT | Performed by: PHYSICAL THERAPIST

## 2021-07-20 PROCEDURE — 97140 MANUAL THERAPY 1/> REGIONS: CPT | Performed by: PHYSICAL THERAPIST

## 2021-07-20 PROCEDURE — 97110 THERAPEUTIC EXERCISES: CPT | Performed by: PHYSICAL THERAPIST

## 2021-07-20 NOTE — PROGRESS NOTES
Physical Therapy Daily Progress Note  Evangelina Levine   1965   Primary Diagnosis: Humeral head fracture, left, closed, initial encounter [S42.292A]   Type: THERAPY  Noted: 6/29/2021 7/20/2021   Visits: 5       Subjective   Pt reports: Feeling somewhat better. Less difficulty getting dressed.      Objective     See Exercise, Manual, and Modality Logs for complete treatment.     Patient Education: HEP    Assessment/Plan Guarding with PROM but improves with rx.      Progress per Plan of Care            Timed:         Manual Therapy:    15     mins  10671;     Therapeutic Exercise:    15     mins  25934;     Neuromuscular Barbie:    10    mins  98069;    Therapeutic Activity:          mins  69170;     Gait Training:           mins  54079;     Ultrasound:          mins  09530;    Ionto                                   mins   15301  Self Care                            mins   45484    Un-Timed:  Electrical Stimulation:         mins  21362 ( );  Traction          mins 91408  Low Eval          Mins  76342  Mod Eval          Mins  34690  High Eval                            Mins  45569  Re-Eval                               mins  87102    Timed Treatment:   40   mins   Total Treatment:     40   mins    Todd Saunders, PT, DPT, OCS  [place] license: [id number]  Physical Therapist

## 2021-07-22 ENCOUNTER — TREATMENT (OUTPATIENT)
Dept: PHYSICAL THERAPY | Facility: CLINIC | Age: 56
End: 2021-07-22

## 2021-07-22 DIAGNOSIS — S42.292A HUMERAL HEAD FRACTURE, LEFT, CLOSED, INITIAL ENCOUNTER: Primary | ICD-10-CM

## 2021-07-22 PROCEDURE — 97110 THERAPEUTIC EXERCISES: CPT | Performed by: PHYSICAL THERAPIST

## 2021-07-22 PROCEDURE — 97112 NEUROMUSCULAR REEDUCATION: CPT | Performed by: PHYSICAL THERAPIST

## 2021-07-22 PROCEDURE — 97140 MANUAL THERAPY 1/> REGIONS: CPT | Performed by: PHYSICAL THERAPIST

## 2021-07-22 NOTE — PROGRESS NOTES
Physical Therapy Daily Progress Note  Evangelina Levine   1965   Primary Diagnosis: Humeral head fracture, left, closed, initial encounter [S42.292A]   Type: THERAPY  Noted: 6/29/2021 7/22/2021   Visits: 6       Subjective   Pt reports: Stiff and sore today, which pt relates to having to clean up after her dog late last night. HEP going well.      Objective     See Exercise, Manual, and Modality Logs for complete treatment.     Patient Education: HEP    Assessment/Plan Responding well to rx, ROM improving.      Progress per Plan of Care            Timed:         Manual Therapy:    15     mins  37770;     Therapeutic Exercise:    15     mins  89675;     Neuromuscular Barbie:    8    mins  12575;    Therapeutic Activity:          mins  78115;     Gait Training:           mins  80434;     Ultrasound:          mins  38263;    Ionto                                   mins   80589  Self Care                            mins   35096    Un-Timed:  Electrical Stimulation:         mins  47740 ( );  Traction          mins 50937  Low Eval          Mins  14944  Mod Eval          Mins  44819  High Eval                            Mins  10170  Re-Eval                               mins  54486    Timed Treatment:   38   mins   Total Treatment:     38   mins    Todd Saunders, PT, DPT, OCS  [place] license: [id number]  Physical Therapist

## 2021-07-28 ENCOUNTER — TREATMENT (OUTPATIENT)
Dept: PHYSICAL THERAPY | Facility: CLINIC | Age: 56
End: 2021-07-28

## 2021-07-28 DIAGNOSIS — S42.292A HUMERAL HEAD FRACTURE, LEFT, CLOSED, INITIAL ENCOUNTER: Primary | ICD-10-CM

## 2021-07-28 PROCEDURE — 97112 NEUROMUSCULAR REEDUCATION: CPT | Performed by: PHYSICAL THERAPIST

## 2021-07-28 PROCEDURE — 97140 MANUAL THERAPY 1/> REGIONS: CPT | Performed by: PHYSICAL THERAPIST

## 2021-07-28 PROCEDURE — 97110 THERAPEUTIC EXERCISES: CPT | Performed by: PHYSICAL THERAPIST

## 2021-07-28 NOTE — PROGRESS NOTES
Physical Therapy Daily Progress Note      Patient: Evangelina Levine   : 1965  Diagnosis/ICD-10 Code:  Humeral head fracture, left, closed, initial encounter [S42.292A]  Referring practitioner: Marques Renae MD  Date of Initial Visit: Type: THERAPY  Noted: 2021  Today's Date: 2021  Patient seen for 7 sessions             Subjective Pt says her right shoulder has been hurting her more than her left shoulder lately.    Objective   See Exercise, Manual, and Modality Logs for complete treatment.       Assessment/Plan  Pt needs a few verbal and tactile cues for some exercises.  She had some c/o discomfort with PROM at her end range.    Progress per Plan of Care           Timed:         Manual Therapy:    15     mins  41733;     Therapeutic Exercise:    15     mins  06314;     Neuromuscular Barbie:    15   mins  91941;        Timed Treatment:   45   mins   Total Treatment:     45  mins    Amando Haddad PTA  Physical Therapist Assistant

## 2021-08-04 ENCOUNTER — TREATMENT (OUTPATIENT)
Dept: PHYSICAL THERAPY | Facility: CLINIC | Age: 56
End: 2021-08-04

## 2021-08-04 DIAGNOSIS — S42.292A HUMERAL HEAD FRACTURE, LEFT, CLOSED, INITIAL ENCOUNTER: Primary | ICD-10-CM

## 2021-08-04 PROCEDURE — 97112 NEUROMUSCULAR REEDUCATION: CPT | Performed by: PHYSICAL THERAPIST

## 2021-08-04 PROCEDURE — 97110 THERAPEUTIC EXERCISES: CPT | Performed by: PHYSICAL THERAPIST

## 2021-08-04 PROCEDURE — 97530 THERAPEUTIC ACTIVITIES: CPT | Performed by: PHYSICAL THERAPIST

## 2021-08-04 NOTE — PROGRESS NOTES
Physical Therapy Daily Progress Note      Patient: Evangelina Levine   : 1965  Diagnosis/ICD-10 Code:  Humeral head fracture, left, closed, initial encounter [S42.292A]  Referring practitioner: Marques Renae MD  Date of Initial Visit: Type: THERAPY  Noted: 2021  Today's Date: 2021  Patient seen for 8 sessions             Subjective Pt's shoulder is sore from sweeping and mopping her floors today.    Objective   See Exercise, Manual, and Modality Logs for complete treatment.       Assessment/Plan  Pt needs a few verbal and tactile cues for exercise technique correction.  Tolerates exercises well.  PROM improving but has occasional c/o pain at end range.    Progress per Plan of Care           Timed:         Manual Therapy:    15     mins  91396;     Therapeutic Exercise:    15     mins  94980;     Neuromuscular Barbie:    15    mins  07967;        Timed Treatment:   45   mins   Total Treatment:     45   mins    Amando Haddad PTA  Physical Therapist Assistant

## 2021-08-06 ENCOUNTER — TREATMENT (OUTPATIENT)
Dept: PHYSICAL THERAPY | Facility: CLINIC | Age: 56
End: 2021-08-06

## 2021-08-06 DIAGNOSIS — S42.292A HUMERAL HEAD FRACTURE, LEFT, CLOSED, INITIAL ENCOUNTER: Primary | ICD-10-CM

## 2021-08-06 PROCEDURE — 97112 NEUROMUSCULAR REEDUCATION: CPT | Performed by: PHYSICAL THERAPIST

## 2021-08-06 PROCEDURE — 97110 THERAPEUTIC EXERCISES: CPT | Performed by: PHYSICAL THERAPIST

## 2021-08-06 PROCEDURE — 97140 MANUAL THERAPY 1/> REGIONS: CPT | Performed by: PHYSICAL THERAPIST

## 2021-08-06 NOTE — PROGRESS NOTES
Physical Therapy Daily Progress Note      Patient: Evangelina Levine   : 1965  Diagnosis/ICD-10 Code:  Humeral head fracture, left, closed, initial encounter [S42.292A]  Referring practitioner: Marques Renae MD  Date of Initial Visit: Type: THERAPY  Noted: 2021  Today's Date: 2021  Patient seen for 9 sessions             Subjective Pt is concerned that she is not fully recovered.    Objective   See Exercise, Manual, and Modality Logs for complete treatment.       Assessment/Plan  Pt is progressing, but ROM is still limited from full range.    Progress per Plan of Care           Timed:         Manual Therapy:    15     mins  51639;     Therapeutic Exercise:    15     mins  17638;     Neuromuscular Barbie:    15    mins  91491;          Timed Treatment:   45   mins   Total Treatment:     45   mins    Amando Haddad PTA  Physical Therapist Assistant

## 2021-08-11 ENCOUNTER — TREATMENT (OUTPATIENT)
Dept: PHYSICAL THERAPY | Facility: CLINIC | Age: 56
End: 2021-08-11

## 2021-08-11 DIAGNOSIS — S42.292A HUMERAL HEAD FRACTURE, LEFT, CLOSED, INITIAL ENCOUNTER: Primary | ICD-10-CM

## 2021-08-11 PROCEDURE — 97110 THERAPEUTIC EXERCISES: CPT | Performed by: PHYSICAL THERAPIST

## 2021-08-11 PROCEDURE — G0283 ELEC STIM OTHER THAN WOUND: HCPCS | Performed by: PHYSICAL THERAPIST

## 2021-08-11 PROCEDURE — 97112 NEUROMUSCULAR REEDUCATION: CPT | Performed by: PHYSICAL THERAPIST

## 2021-08-11 NOTE — PROGRESS NOTES
Re-Assessment / Re-Certification        Patient: Evangelina Levine   : 1965  Diagnosis/ICD-10 Code:  Humeral head fracture, left, closed, initial encounter [S42.292A]  Referring practitioner: Marques Renae MD  Date of Initial Visit: Type: THERAPY  Noted: 2021  Today's Date: 2021  Patient seen for 10 sessions      Subjective:     Subjective Questionnaire: QuickDASH: 39%  Clinical Progress: improved  Home Program Compliance: Yes  Treatment has included: therapeutic exercise, neuromuscular re-education, manual therapy and electrical stimulation    Subjective 5-6/10 pain at worst. Pt notes decreased difficulty fixing her hair, getting dressed, and with daily activities. Still limited with functional IR activities and reaching/lifting with much resistance.   Objective          Active Range of Motion   Left Shoulder   Flexion: 120 degrees with pain  Abduction: 120 degrees with pain  External rotation 0°: 60 degrees   Internal rotation BTB: L5 with pain    Passive Range of Motion   Left Shoulder   Flexion: 150 degrees   Abduction: 150 degrees   External rotation 0°: 65 degrees   Internal rotation 45°: 30 degrees     Strength/Myotome Testing     Left Shoulder     Planes of Motion   Flexion: 4-   Abduction: 3+   External rotation at 0°: 4-   Internal rotation at 0°: 4-       Assessment/Plan   Pt is making progress re: her ROM, strength, function, and pain symptoms. However, she is still limited in these areas. Recommend continuing with PT evaluation and treatment.     Continue 1-2 times per week for 10 visits  Prognosis to achieve goals: gregor Saunders, PT, DPT, OCS  [place] license: [id number]  Physical Therapist      Based upon review of the patient's progress and continued therapy plan, it is my medical opinion that Evangelina Levine should continue physical therapy treatment at [facility]  [address] [place] [address] [address] [place]  [id number] [phone].    Provider: _____________________________    Marques Renae MD     Timed:         Manual Therapy:         mins  72610;     Therapeutic Exercise:    15     mins  68413;     Neuromuscular Barbie:    15    mins  97204;    Therapeutic Activity:          mins  75171;     Gait Training:           mins  19336;     Ultrasound:          mins  62224;    Ionto                                   mins   52001  Self Care                            mins   07192    Un-Timed:  Electrical Stimulation: 15        mins  01254 ( );  Traction          mins 14384  Low Eval          Mins  37779  Mod Eval          Mins  15671  High Eval                            Mins  79844  Re-Eval                               mins  70900      Timed Treatment:   30   mins   Total Treatment:     45   mins

## 2021-08-13 ENCOUNTER — TREATMENT (OUTPATIENT)
Dept: PHYSICAL THERAPY | Facility: CLINIC | Age: 56
End: 2021-08-13

## 2021-08-13 DIAGNOSIS — S42.292A HUMERAL HEAD FRACTURE, LEFT, CLOSED, INITIAL ENCOUNTER: Primary | ICD-10-CM

## 2021-08-13 PROCEDURE — 97112 NEUROMUSCULAR REEDUCATION: CPT | Performed by: PHYSICAL THERAPIST

## 2021-08-13 PROCEDURE — 97110 THERAPEUTIC EXERCISES: CPT | Performed by: PHYSICAL THERAPIST

## 2021-08-13 NOTE — PROGRESS NOTES
Physical Therapy Daily Progress Note      Patient: Evangelina Levine   : 1965  Diagnosis/ICD-10 Code:  Humeral head fracture, left, closed, initial encounter [S42.292A]  Referring practitioner: Marques Renae MD  Date of Initial Visit: Type: THERAPY  Noted: 2021  Today's Date: 2021  Patient seen for 11 sessions             Subjective Pt did not notice lasting benefit from stim last visit.  She still has c/o shoulder stiffness not too long after doing her exercises and pulley's at home.    Objective   See Exercise, Manual, and Modality Logs for complete treatment.       Assessment/Plan  Pt demonstrates good understanding of exercises and overall good tolerance of exercises.   Held stim per pt.request.    Progress per Plan of Care           Timed:              Therapeutic Exercise:    15     mins  91924;     Neuromuscular Barbie:    15    mins  50569;        Timed Treatment:   30   mins   Total Treatment:     30   mins    Amando Haddad PTA  Physical Therapist Assistant

## 2021-08-16 ENCOUNTER — TREATMENT (OUTPATIENT)
Dept: PHYSICAL THERAPY | Facility: CLINIC | Age: 56
End: 2021-08-16

## 2021-08-16 DIAGNOSIS — S42.292A HUMERAL HEAD FRACTURE, LEFT, CLOSED, INITIAL ENCOUNTER: Primary | ICD-10-CM

## 2021-08-16 PROCEDURE — 97112 NEUROMUSCULAR REEDUCATION: CPT | Performed by: PHYSICAL THERAPIST

## 2021-08-16 PROCEDURE — 97140 MANUAL THERAPY 1/> REGIONS: CPT | Performed by: PHYSICAL THERAPIST

## 2021-08-16 PROCEDURE — G0283 ELEC STIM OTHER THAN WOUND: HCPCS | Performed by: PHYSICAL THERAPIST

## 2021-08-16 NOTE — PROGRESS NOTES
Physical Therapy Daily Progress Note  Evangelina Levine   1965   Primary Diagnosis: Humeral head fracture, left, closed, initial encounter [S42.292A]   Type: THERAPY  Noted: 6/29/2021 8/16/2021   Visits: 12       Subjective   Pt reports: Still limited with resisted activities and functional IR. HEP going well.      Objective     See Exercise, Manual, and Modality Logs for complete treatment.     Patient Education: HEP    Assessment/Plan Fatigued post visit. IR ROM improving.      Progress per Plan of Care            Timed:         Manual Therapy:    15     mins  96209;     Therapeutic Exercise:         mins  27523;     Neuromuscular Barbie:    15    mins  06760;    Therapeutic Activity:          mins  21365;     Gait Training:           mins  58650;     Ultrasound:          mins  74646;    Ionto                                   mins   56686  Self Care                            mins   46156    Un-Timed:  Electrical Stimulation:    15     mins  33235 ( );  Traction          mins 53727  Low Eval          Mins  69746  Mod Eval          Mins  77097  High Eval                           Mins  21320  Re-Eval                               mins  46737    Timed Treatment:   30   mins   Total Treatment:     45   mins    Todd Saunders PT, DPT, OCS  [place] license: [id number]  Physical Therapist

## 2021-08-19 ENCOUNTER — TREATMENT (OUTPATIENT)
Dept: PHYSICAL THERAPY | Facility: CLINIC | Age: 56
End: 2021-08-19

## 2021-08-19 DIAGNOSIS — S42.292A HUMERAL HEAD FRACTURE, LEFT, CLOSED, INITIAL ENCOUNTER: Primary | ICD-10-CM

## 2021-08-19 PROCEDURE — 97110 THERAPEUTIC EXERCISES: CPT | Performed by: PHYSICAL THERAPIST

## 2021-08-19 PROCEDURE — 97112 NEUROMUSCULAR REEDUCATION: CPT | Performed by: PHYSICAL THERAPIST

## 2021-08-19 PROCEDURE — G0283 ELEC STIM OTHER THAN WOUND: HCPCS | Performed by: PHYSICAL THERAPIST

## 2021-08-19 NOTE — PROGRESS NOTES
Physical Therapy Daily Progress Note  Evangelina Levine   1965   Primary Diagnosis: Humeral head fracture, left, closed, initial encounter [S42.292A]   Type: THERAPY  Noted: 6/29/2021 8/19/2021   Visits: 13       Subjective   Pt reports: Stiffness improving. HEP going well.      Objective     See Exercise, Manual, and Modality Logs for complete treatment.     Patient Education: HEP    Assessment/Plan Flexion AROM grossly improved post visit.      Progress per Plan of Care            Timed:         Manual Therapy:         mins  90968;     Therapeutic Exercise:    25     mins  05591;     Neuromuscular Barbie:    13    mins  92421;    Therapeutic Activity:          mins  49179;     Gait Training:           mins  43719;     Ultrasound:          mins  69923;    Ionto                                   mins   87861  Self Care                            mins   53103    Un-Timed:  Electrical Stimulation:    15     mins  27370 ( );  Traction          mins 42168  Low Eval          Mins  18901  Mod Eval          Mins  43863  High Eval                            Mins  89831  Re-Eval                               mins  48180    Timed Treatment:   38   mins   Total Treatment:     53   mins    Todd Saunders, PT, DPT, OCS  [place] license: [id number]  Physical Therapist

## 2021-08-20 ENCOUNTER — TRANSCRIBE ORDERS (OUTPATIENT)
Dept: ADMINISTRATIVE | Facility: HOSPITAL | Age: 56
End: 2021-08-20

## 2021-08-20 ENCOUNTER — HOSPITAL ENCOUNTER (OUTPATIENT)
Dept: GENERAL RADIOLOGY | Facility: HOSPITAL | Age: 56
Discharge: HOME | End: 2021-08-20

## 2021-08-20 DIAGNOSIS — M25.511 RIGHT SHOULDER PAIN, UNSPECIFIED CHRONICITY: ICD-10-CM

## 2021-08-20 DIAGNOSIS — T14.8XXA FX: ICD-10-CM

## 2021-08-20 DIAGNOSIS — M25.511 RIGHT SHOULDER PAIN, UNSPECIFIED CHRONICITY: Primary | ICD-10-CM

## 2021-08-20 DIAGNOSIS — T14.8XXA FX: Primary | ICD-10-CM

## 2021-08-20 PROCEDURE — 73030 X-RAY EXAM OF SHOULDER: CPT

## 2021-08-23 ENCOUNTER — TELEPHONE (OUTPATIENT)
Dept: PHYSICAL THERAPY | Facility: CLINIC | Age: 56
End: 2021-08-23

## 2021-12-07 ENCOUNTER — HOSPITAL ENCOUNTER (EMERGENCY)
Facility: HOSPITAL | Age: 56
Discharge: LEFT BEFORE DISCHARGE | End: 2021-12-07

## 2021-12-07 VITALS
BODY MASS INDEX: 37.79 KG/M2 | HEART RATE: 91 BPM | SYSTOLIC BLOOD PRESSURE: 139 MMHG | RESPIRATION RATE: 18 BRPM | OXYGEN SATURATION: 96 % | TEMPERATURE: 98.2 F | HEIGHT: 64 IN | WEIGHT: 221.34 LBS | DIASTOLIC BLOOD PRESSURE: 75 MMHG

## 2021-12-07 PROCEDURE — 99211 OFF/OP EST MAY X REQ PHY/QHP: CPT

## 2021-12-07 RX ORDER — SODIUM CHLORIDE 0.9 % (FLUSH) 0.9 %
10 SYRINGE (ML) INJECTION AS NEEDED
Status: DISCONTINUED | OUTPATIENT
Start: 2021-12-07 | End: 2021-12-07

## 2022-01-10 ENCOUNTER — OFFICE VISIT (OUTPATIENT)
Dept: SURGERY | Facility: CLINIC | Age: 57
End: 2022-01-10

## 2022-01-10 VITALS
SYSTOLIC BLOOD PRESSURE: 126 MMHG | HEIGHT: 64 IN | BODY MASS INDEX: 36.54 KG/M2 | HEART RATE: 57 BPM | OXYGEN SATURATION: 96 % | DIASTOLIC BLOOD PRESSURE: 80 MMHG | TEMPERATURE: 97.5 F | WEIGHT: 214 LBS

## 2022-01-10 DIAGNOSIS — K21.9 GASTROESOPHAGEAL REFLUX DISEASE WITHOUT ESOPHAGITIS: Primary | ICD-10-CM

## 2022-01-10 PROCEDURE — 99204 OFFICE O/P NEW MOD 45 MIN: CPT | Performed by: STUDENT IN AN ORGANIZED HEALTH CARE EDUCATION/TRAINING PROGRAM

## 2022-01-10 RX ORDER — FLUTICASONE PROPIONATE 50 MCG
SPRAY, SUSPENSION (ML) NASAL
COMMUNITY
Start: 2021-12-14

## 2022-01-10 RX ORDER — HYDROCODONE BITARTRATE AND ACETAMINOPHEN 10; 325 MG/1; MG/1
TABLET ORAL EVERY 8 HOURS SCHEDULED
COMMUNITY

## 2022-01-10 RX ORDER — METHOCARBAMOL 750 MG/1
750 TABLET, FILM COATED ORAL 3 TIMES DAILY
COMMUNITY

## 2022-01-10 NOTE — PROGRESS NOTES
"Chief Complaint  New Patient (Hiatal Hernia )    Subjective          Evangelina Levine presents to [facility] GENERAL SURGERY  History of Present Illness    56-year-old lady with history of hysterectomy, laparoscopic cholecystectomy, states she has hiatal hernia was seen on previous EGD but I do not have access to those records and no large hiatal hernia on her CT abdomen and pelvis, COPD, hyperlipidemia, bipolar disorder, chronic pain, patient thinks she may have gastroparesis was started on Reglan for this in the past.  She presents my office to discuss upper abdominal wall pain.  No changes in bowel function, daily bowel movements, no constipation or diarrhea, no bloody bowel movements has lost 50 pounds intentionally over the past year.  She has had reflux for multiple years, thinks she has a hiatal hernia that was seen on her previous EGD, but she has developed worsening chest pain, does have some dysphagia with solids intermittently, think she had bad gastritis on her last EGD.  She takes omeprazole daily this is help with her reflux.  CT abdomen reviewed with small lower midline hernia containing loops of bowel with no obstruction, rectus diastases in the upper midline.      Objective   Vital Signs:   /80 (Cuff Size: Adult)   Pulse 57   Temp 97.5 °F (36.4 °C) (Infrared)   Ht 162.6 cm (64\")   Wt 97.1 kg (214 lb)   SpO2 96%   BMI 36.73 kg/m²     Physical Exam  Constitutional:       General: She is not in acute distress.     Appearance: Normal appearance. She is not ill-appearing.   HENT:      Head: Normocephalic and atraumatic.      Right Ear: External ear normal.      Left Ear: External ear normal.   Eyes:      Extraocular Movements: Extraocular movements intact.      Conjunctiva/sclera: Conjunctivae normal.   Cardiovascular:      Rate and Rhythm: Normal rate and regular rhythm.   Pulmonary:      Effort: Pulmonary effort is normal. No respiratory distress.   Abdominal:      " General: There is no distension.      Palpations: Abdomen is soft.      Comments: Upper abdominal wall tenderness, rectus diastases but no hernia palpated, lower abdominal hernia palpated and easily reducible, nontender   Musculoskeletal:         General: No swelling or deformity.   Skin:     General: Skin is warm and dry.   Neurological:      Mental Status: She is alert and oriented to person, place, and time. Mental status is at baseline.        Result Review :                 Assessment and Plan    Diagnoses and all orders for this visit:    1. Gastroesophageal reflux disease without esophagitis (Primary)      56-year-old lady with history of hysterectomy, laparoscopic cholecystectomy, states she has hiatal hernia was seen on previous EGD but I do not have access to those records and no large hiatal hernia on her CT abdomen and pelvis, COPD, hyperlipidemia, bipolar disorder, chronic pain, patient thinks she may have gastroparesis was started on Reglan for this in the past.  She presents my office to discuss upper abdominal wall pain.  No changes in bowel function, daily bowel movements, no constipation or diarrhea, no bloody bowel movements has lost 50 pounds intentionally over the past year.  She has had reflux for multiple years, thinks she has a hiatal hernia that was seen on her previous EGD, but she has developed worsening chest pain, does have some dysphagia with solids intermittently, think she had bad gastritis on her last EGD.  She takes omeprazole daily this is help with her reflux.  CT abdomen reviewed with small lower midline hernia containing loops of bowel with no obstruction, rectus diastases in the upper midline, suggested wearing abdominal binder.  She may ultimately need an esophagram or gastric emptying study, but needs EGD first.  She has colonoscopy scheduled for next week we will reach out to [facility] to see if they can also do an EGD.  Images from her CT reviewed, no CT pelvis but she has a  rectus diastases in her upper abdomen no hernia, small hernia in her lower midline but no sign of obstruction patient is not symptomatic from this.  She is also currently losing weight intentionally so if she would like this repaired would be better to repair this when she loses more weight.  Last colonoscopy note reviewed transverse colon tubular adenoma no other adenomas.  I will follow-up on endoscopy report, appreciate GI input on patient's symptoms.  If she does have esophagitis or hiatal hernia, may be a good candidate for hiatal hernia repair given her symptoms however will need at least an esophagram and potentially gastric emptying study before proceeding with surgical intervention.        Follow Up   Return in about 1 month (around 2/10/2022).  Patient was given instructions and counseling regarding her condition or for health maintenance advice. Please see specific information pulled into the AVS if appropriate.

## 2022-01-21 ENCOUNTER — OFFICE (AMBULATORY)
Dept: URBAN - METROPOLITAN AREA PATHOLOGY 4 | Facility: PATHOLOGY | Age: 57
End: 2022-01-21
Payer: MEDICARE

## 2022-01-21 ENCOUNTER — OFFICE (AMBULATORY)
Dept: URBAN - METROPOLITAN AREA PATHOLOGY 4 | Facility: PATHOLOGY | Age: 57
End: 2022-01-21
Payer: COMMERCIAL

## 2022-01-21 ENCOUNTER — ON CAMPUS - OUTPATIENT (AMBULATORY)
Dept: URBAN - METROPOLITAN AREA HOSPITAL 2 | Facility: HOSPITAL | Age: 57
End: 2022-01-21
Payer: MEDICARE

## 2022-01-21 VITALS
TEMPERATURE: 97.7 F | DIASTOLIC BLOOD PRESSURE: 105 MMHG | OXYGEN SATURATION: 97 % | HEART RATE: 62 BPM | SYSTOLIC BLOOD PRESSURE: 127 MMHG | HEART RATE: 67 BPM | DIASTOLIC BLOOD PRESSURE: 78 MMHG | SYSTOLIC BLOOD PRESSURE: 106 MMHG | SYSTOLIC BLOOD PRESSURE: 134 MMHG | SYSTOLIC BLOOD PRESSURE: 122 MMHG | SYSTOLIC BLOOD PRESSURE: 133 MMHG | HEART RATE: 65 BPM | HEART RATE: 61 BPM | DIASTOLIC BLOOD PRESSURE: 70 MMHG | SYSTOLIC BLOOD PRESSURE: 143 MMHG | DIASTOLIC BLOOD PRESSURE: 73 MMHG | OXYGEN SATURATION: 98 % | HEART RATE: 68 BPM | OXYGEN SATURATION: 93 % | DIASTOLIC BLOOD PRESSURE: 83 MMHG | SYSTOLIC BLOOD PRESSURE: 146 MMHG | SYSTOLIC BLOOD PRESSURE: 111 MMHG | DIASTOLIC BLOOD PRESSURE: 85 MMHG | SYSTOLIC BLOOD PRESSURE: 176 MMHG | WEIGHT: 210 LBS | SYSTOLIC BLOOD PRESSURE: 145 MMHG | HEART RATE: 66 BPM | HEART RATE: 75 BPM | RESPIRATION RATE: 16 BRPM | DIASTOLIC BLOOD PRESSURE: 67 MMHG | DIASTOLIC BLOOD PRESSURE: 79 MMHG | DIASTOLIC BLOOD PRESSURE: 71 MMHG | HEART RATE: 60 BPM | OXYGEN SATURATION: 94 % | OXYGEN SATURATION: 96 % | HEIGHT: 64 IN | DIASTOLIC BLOOD PRESSURE: 77 MMHG | SYSTOLIC BLOOD PRESSURE: 118 MMHG | DIASTOLIC BLOOD PRESSURE: 75 MMHG

## 2022-01-21 DIAGNOSIS — K44.9 DIAPHRAGMATIC HERNIA WITHOUT OBSTRUCTION OR GANGRENE: ICD-10-CM

## 2022-01-21 DIAGNOSIS — K31.89 OTHER DISEASES OF STOMACH AND DUODENUM: ICD-10-CM

## 2022-01-21 DIAGNOSIS — Z86.010 PERSONAL HISTORY OF COLONIC POLYPS: ICD-10-CM

## 2022-01-21 DIAGNOSIS — R13.10 DYSPHAGIA, UNSPECIFIED: ICD-10-CM

## 2022-01-21 DIAGNOSIS — K57.30 DIVERTICULOSIS OF LARGE INTESTINE WITHOUT PERFORATION OR ABS: ICD-10-CM

## 2022-01-21 LAB
GI HISTOLOGY: A. SELECT: (no result)
GI HISTOLOGY: PDF REPORT: (no result)

## 2022-01-21 PROCEDURE — 43239 EGD BIOPSY SINGLE/MULTIPLE: CPT | Performed by: INTERNAL MEDICINE

## 2022-01-21 PROCEDURE — 45330 DIAGNOSTIC SIGMOIDOSCOPY: CPT | Performed by: INTERNAL MEDICINE

## 2022-01-21 PROCEDURE — 43450 DILATE ESOPHAGUS 1/MULT PASS: CPT | Performed by: INTERNAL MEDICINE

## 2022-01-21 PROCEDURE — 88305 TISSUE EXAM BY PATHOLOGIST: CPT | Mod: 26 | Performed by: INTERNAL MEDICINE

## 2022-01-25 ENCOUNTER — TELEPHONE (OUTPATIENT)
Dept: SURGERY | Facility: CLINIC | Age: 57
End: 2022-01-25

## 2022-01-25 NOTE — TELEPHONE ENCOUNTER
Per Dr. Jain:  Patient had hiatal hernia on her scope. Do you mind calling her when you have a chance and see if she would like to come back in to discuss surgery or if she would rather hold off for now?    Called and spoke with patient.  She will hold on surgery for now.  She has an appt with [facility] in couple weeks and would like to discuss with them.

## 2022-03-25 ENCOUNTER — OFFICE (AMBULATORY)
Dept: URBAN - METROPOLITAN AREA CLINIC 64 | Facility: CLINIC | Age: 57
End: 2022-03-25

## 2022-03-25 VITALS
SYSTOLIC BLOOD PRESSURE: 126 MMHG | DIASTOLIC BLOOD PRESSURE: 71 MMHG | HEIGHT: 64 IN | HEART RATE: 61 BPM | WEIGHT: 210 LBS

## 2022-03-25 DIAGNOSIS — R13.10 DYSPHAGIA, UNSPECIFIED: ICD-10-CM

## 2022-03-25 DIAGNOSIS — R14.0 ABDOMINAL DISTENSION (GASEOUS): ICD-10-CM

## 2022-03-25 DIAGNOSIS — R68.81 EARLY SATIETY: ICD-10-CM

## 2022-03-25 PROCEDURE — 99213 OFFICE O/P EST LOW 20 MIN: CPT | Performed by: NURSE PRACTITIONER

## 2022-03-25 RX ORDER — SACCHAROMYCES BOULARDII 50 MG
500 CAPSULE ORAL
Qty: 60 | Refills: 11 | Status: ACTIVE
Start: 2022-03-25

## 2023-06-16 ENCOUNTER — OFFICE VISIT (OUTPATIENT)
Dept: PSYCHIATRY | Facility: CLINIC | Age: 58
End: 2023-06-16
Payer: MEDICARE

## 2023-06-16 DIAGNOSIS — G47.00 INSOMNIA, UNSPECIFIED TYPE: Primary | Chronic | ICD-10-CM

## 2023-06-16 DIAGNOSIS — F41.8 ANXIETY ASSOCIATED WITH DEPRESSION: Chronic | ICD-10-CM

## 2023-06-16 RX ORDER — HYDROXYZINE HYDROCHLORIDE 25 MG/1
25 TABLET, FILM COATED ORAL 2 TIMES DAILY PRN
Qty: 60 TABLET | Refills: 0 | Status: SHIPPED | OUTPATIENT
Start: 2023-06-16
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
## 2023-09-18 ENCOUNTER — OFFICE VISIT (OUTPATIENT)
Dept: PSYCHIATRY | Facility: CLINIC | Age: 58
End: 2023-09-18
Payer: MEDICARE

## 2023-09-18 VITALS
WEIGHT: 176.8 LBS | DIASTOLIC BLOOD PRESSURE: 77 MMHG | BODY MASS INDEX: 30.35 KG/M2 | SYSTOLIC BLOOD PRESSURE: 132 MMHG | HEART RATE: 59 BPM | OXYGEN SATURATION: 96 %

## 2023-09-18 DIAGNOSIS — F31.81 BIPOLAR II DISORDER: Primary | Chronic | ICD-10-CM

## 2023-09-18 DIAGNOSIS — F41.8 ANXIETY ASSOCIATED WITH DEPRESSION: Chronic | ICD-10-CM

## 2023-09-18 RX ORDER — LAMOTRIGINE 150 MG/1
150 TABLET ORAL DAILY
Qty: 30 TABLET | Refills: 2 | Status: SHIPPED | OUTPATIENT
Start: 2023-09-18 | End: 2023-09-21 | Stop reason: SDUPTHER
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
## 2023-09-20 NOTE — PROGRESS NOTES
I have reviewed the notes, assessments, and/or procedures performed by Michaela Santoyo, I concur with her/his documentation of Evangelina Levine.

## 2023-09-21 DIAGNOSIS — F31.81 BIPOLAR II DISORDER: Chronic | ICD-10-CM

## 2023-09-21 DIAGNOSIS — F41.8 ANXIETY ASSOCIATED WITH DEPRESSION: Chronic | ICD-10-CM

## 2023-09-21 RX ORDER — LAMOTRIGINE 150 MG/1
150 TABLET ORAL DAILY
Qty: 30 TABLET | Refills: 2 | Status: SHIPPED | OUTPATIENT
Start: 2023-09-21

## 2023-09-21 NOTE — TELEPHONE ENCOUNTER
Rx Refill Note  Requested Prescriptions      No prescriptions requested or ordered in this encounter      Last office visit with prescribing clinician: 9/18/2023     Next office visit with prescribing clinician: 12/18/2023     Progress Notes by Michaela Santoyo APRN (09/18/2023 11:30)     Med check - 12-    Patient states she needs to the Lamictal to be sent to [facility] and not [facility] pharmacy.      Yasmin Wray MA  09/21/23, 11:25 EDT

## 2023-10-23 DIAGNOSIS — F31.81 BIPOLAR II DISORDER: Chronic | ICD-10-CM

## 2023-10-23 DIAGNOSIS — F41.8 ANXIETY ASSOCIATED WITH DEPRESSION: Chronic | ICD-10-CM

## 2023-10-23 RX ORDER — TRAZODONE HYDROCHLORIDE 100 MG/1
100 TABLET ORAL NIGHTLY
Qty: 60 TABLET | Refills: 2 | Status: SHIPPED | OUTPATIENT
Start: 2023-10-23 | End: 2023-10-27

## 2023-10-23 RX ORDER — LAMOTRIGINE 100 MG/1
TABLET ORAL
Qty: 105 TABLET | Refills: 2 | Status: SHIPPED | OUTPATIENT
Start: 2023-10-23

## 2023-10-23 NOTE — TELEPHONE ENCOUNTER
Rx Refill Note  Requested Prescriptions     Pending Prescriptions Disp Refills    lamoTRIgine (LaMICtal) 150 MG tablet 30 tablet 2     Sig: Take 1 tablet by mouth Daily. Patient takes two 100 mg tabs in the AM and 1.5 tabs in the PM.    traZODone (DESYREL) 100 MG tablet       Sig: Take 1 tablet by mouth Every Night. Patient takes two 100 mg tabs QHS      Last office visit with prescribing clinician: 9/18/2023     Next office visit with prescribing clinician: 12/18/2023     [facility] NEEDS CLARIFICATION ON THE LAMOTRIGINE SCRIPT.    Office Visit with Michaela Santoyo APRN (09/18/2023)     Med check - 12-    Yasmin Wray MA  10/23/23, 09:52 EDT

## 2023-10-27 RX ORDER — TRAZODONE HYDROCHLORIDE 100 MG/1
200 TABLET ORAL NIGHTLY
Qty: 60 TABLET | Refills: 2 | Status: SHIPPED | OUTPATIENT
Start: 2023-10-27

## 2023-11-13 ENCOUNTER — OFFICE VISIT (OUTPATIENT)
Dept: PSYCHIATRY | Facility: CLINIC | Age: 58
End: 2023-11-13
Payer: MEDICARE

## 2023-11-13 DIAGNOSIS — F41.1 GENERALIZED ANXIETY DISORDER: ICD-10-CM

## 2023-11-13 DIAGNOSIS — F31.81 BIPOLAR II DISORDER: Primary | ICD-10-CM

## 2023-11-13 PROCEDURE — 90791 PSYCH DIAGNOSTIC EVALUATION: CPT | Performed by: SOCIAL WORKER
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
## 2023-12-04 DIAGNOSIS — F41.8 ANXIETY ASSOCIATED WITH DEPRESSION: Chronic | ICD-10-CM

## 2023-12-04 DIAGNOSIS — F31.81 BIPOLAR II DISORDER: Chronic | ICD-10-CM

## 2023-12-06 RX ORDER — ESCITALOPRAM OXALATE 20 MG/1
20 TABLET ORAL DAILY
Qty: 30 TABLET | Refills: 10 | Status: SHIPPED | OUTPATIENT
Start: 2023-12-06

## 2023-12-06 RX ORDER — LAMOTRIGINE 100 MG/1
TABLET ORAL
Qty: 105 TABLET | Refills: 10 | Status: SHIPPED | OUTPATIENT
Start: 2023-12-06

## 2023-12-21 NOTE — TELEPHONE ENCOUNTER
Rx Refill Note  Requested Prescriptions     Pending Prescriptions Disp Refills    traZODone (DESYREL) 100 MG tablet 60 tablet 2     Sig: Take 2 tablets by mouth Every Night.      Last office visit with prescribing clinician: 9/18/2023     Next office visit with prescribing clinician: 3/14/2024     Office Visit with Michaela Santoyo APRN (09/18/2023)     Med check - 3-    Yasmin Wray MA  12/21/23, 14:56 EST

## 2023-12-22 RX ORDER — TRAZODONE HYDROCHLORIDE 100 MG/1
200 TABLET ORAL NIGHTLY
Qty: 60 TABLET | Refills: 2 | Status: SHIPPED | OUTPATIENT
Start: 2023-12-22
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
## 2024-01-17 ENCOUNTER — OFFICE VISIT (OUTPATIENT)
Dept: PSYCHIATRY | Facility: CLINIC | Age: 59
End: 2024-01-17
Payer: MEDICARE

## 2024-01-17 DIAGNOSIS — F31.81 BIPOLAR II DISORDER: Primary | ICD-10-CM

## 2024-01-17 DIAGNOSIS — F41.1 GENERALIZED ANXIETY DISORDER: ICD-10-CM

## 2024-01-19 ENCOUNTER — TELEPHONE (OUTPATIENT)
Dept: PSYCHIATRY | Facility: CLINIC | Age: 59
End: 2024-01-19
Payer: MEDICARE

## 2024-01-19 RX ORDER — TRAZODONE HYDROCHLORIDE 100 MG/1
100 TABLET ORAL NIGHTLY
Qty: 90 TABLET | Refills: 0 | Status: SHIPPED | OUTPATIENT
Start: 2024-01-19
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
## 2024-03-14 ENCOUNTER — OFFICE VISIT (OUTPATIENT)
Dept: PSYCHIATRY | Facility: CLINIC | Age: 59
End: 2024-03-14
Payer: MEDICARE

## 2024-03-14 DIAGNOSIS — F31.81 BIPOLAR II DISORDER: Primary | ICD-10-CM

## 2024-03-14 DIAGNOSIS — F41.8 ANXIETY ASSOCIATED WITH DEPRESSION: ICD-10-CM

## 2024-03-14 DIAGNOSIS — F41.1 GENERALIZED ANXIETY DISORDER: ICD-10-CM

## 2024-03-14 RX ORDER — TRAZODONE HYDROCHLORIDE 100 MG/1
200 TABLET ORAL NIGHTLY
Qty: 180 TABLET | Refills: 1 | Status: SHIPPED | OUTPATIENT
Start: 2024-03-14
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
## 2024-03-22 ENCOUNTER — TELEPHONE (OUTPATIENT)
Dept: PSYCHIATRY | Facility: CLINIC | Age: 59
End: 2024-03-22
Payer: MEDICARE

## 2024-03-22 NOTE — TELEPHONE ENCOUNTER
Received voice message from patient stating she needed me to call her back.  Attempted to contact patient.  No answer.  LMV asking her to call back.

## 2024-03-25 DIAGNOSIS — F41.8 ANXIETY ASSOCIATED WITH DEPRESSION: Chronic | ICD-10-CM

## 2024-03-25 RX ORDER — HYDROXYZINE HYDROCHLORIDE 25 MG/1
25 TABLET, FILM COATED ORAL 2 TIMES DAILY PRN
Qty: 60 TABLET | Refills: 2 | Status: SHIPPED | OUTPATIENT
Start: 2024-03-25

## 2024-03-25 RX ORDER — HYDROXYZINE HYDROCHLORIDE 25 MG/1
25 TABLET, FILM COATED ORAL 2 TIMES DAILY PRN
Qty: 60 TABLET | Refills: 2 | Status: SHIPPED | OUTPATIENT
Start: 2024-03-25 | End: 2024-03-25 | Stop reason: SDUPTHER

## 2024-03-25 NOTE — TELEPHONE ENCOUNTER
Rx Refill Note  Requested Prescriptions     Pending Prescriptions Disp Refills    hydrOXYzine (ATARAX) 25 MG tablet 60 tablet 0     Sig: Take 1 tablet by mouth 2 (Two) Times a Day As Needed for Anxiety.      Last office visit with prescribing clinician: 3/14/2024     Next office visit with prescribing clinician: 9/5/2024     Office Visit with Michaela Santoyo, LIO, APRN (03/14/2024)     Med check - 9-5-2024     Yasmin Wray MA  03/25/24, 10:16 EDT

## 2024-03-25 NOTE — TELEPHONE ENCOUNTER
Rx Refill Note  Requested Prescriptions     Pending Prescriptions Disp Refills    hydrOXYzine (ATARAX) 25 MG tablet 60 tablet 2     Sig: Take 1 tablet by mouth 2 (Two) Times a Day As Needed for Anxiety.      Last office visit with prescribing clinician: 3/14/2024     Next office visit with prescribing clinician: 9/5/2024     Office Visit with Michaela Santoyo, LIO, APRN (03/14/2024)     Med check - 9-5-2024    Yasmin Wray MA  03/25/24, 13:35 EDT

## 2024-04-18 DIAGNOSIS — F31.81 BIPOLAR II DISORDER: ICD-10-CM

## 2024-04-18 DIAGNOSIS — F41.8 ANXIETY ASSOCIATED WITH DEPRESSION: ICD-10-CM

## 2024-04-18 RX ORDER — TRAZODONE HYDROCHLORIDE 100 MG/1
100 TABLET ORAL
Qty: 90 TABLET | Refills: 0 | Status: SHIPPED | OUTPATIENT
Start: 2024-04-18

## 2024-07-17 DIAGNOSIS — F41.8 ANXIETY ASSOCIATED WITH DEPRESSION: ICD-10-CM

## 2024-07-17 DIAGNOSIS — F31.81 BIPOLAR II DISORDER: ICD-10-CM

## 2024-07-19 RX ORDER — TRAZODONE HYDROCHLORIDE 100 MG/1
200 TABLET ORAL NIGHTLY
Qty: 60 TABLET | Refills: 2 | Status: SHIPPED | OUTPATIENT
Start: 2024-07-19

## 2024-09-05 ENCOUNTER — OFFICE VISIT (OUTPATIENT)
Dept: PSYCHIATRY | Facility: CLINIC | Age: 59
End: 2024-09-05
Payer: MEDICARE

## 2024-09-05 DIAGNOSIS — F31.81 BIPOLAR II DISORDER: Primary | ICD-10-CM

## 2024-09-05 DIAGNOSIS — F41.1 GENERALIZED ANXIETY DISORDER: ICD-10-CM

## 2024-09-05 DIAGNOSIS — F43.10 POST TRAUMATIC STRESS DISORDER (PTSD): ICD-10-CM
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
## 2024-09-10 RX ORDER — ESCITALOPRAM OXALATE 20 MG/1
20 TABLET ORAL DAILY
Qty: 30 TABLET | Refills: 10 | Status: SHIPPED | OUTPATIENT
Start: 2024-09-10

## 2024-10-11 DIAGNOSIS — F41.8 ANXIETY ASSOCIATED WITH DEPRESSION: ICD-10-CM

## 2024-10-11 DIAGNOSIS — F31.81 BIPOLAR II DISORDER: ICD-10-CM

## 2024-10-14 RX ORDER — TRAZODONE HYDROCHLORIDE 100 MG/1
200 TABLET ORAL NIGHTLY
Qty: 60 TABLET | Refills: 10 | Status: SHIPPED | OUTPATIENT
Start: 2024-10-14

## 2024-11-06 DIAGNOSIS — F31.81 BIPOLAR II DISORDER: Chronic | ICD-10-CM

## 2024-11-06 DIAGNOSIS — F41.8 ANXIETY ASSOCIATED WITH DEPRESSION: Chronic | ICD-10-CM

## 2024-11-07 NOTE — TELEPHONE ENCOUNTER
Rx Refill Note  Requested Prescriptions     Pending Prescriptions Disp Refills    lamoTRIgine (LaMICtal) 100 MG tablet [Pharmacy Med Name: LAMOTRIGINE 100 MG TABLET 100 Tablet] 105 tablet 10     Sig: TAKE TWO (2) TABLETS BY MOUTH EACH MORNING AND TAKE 1 & 1/2 TABLETS IN THE EVENING      Last office visit with prescribing clinician: 9/5/2024     Next office visit with prescribing clinician: 6/17/2025     Office Visit with Michaela Santoyo, LIO, APRN (09/05/2024)     Med check - 6-    Yasmin Wray MA  11/07/24, 08:50 EST

## 2024-11-08 RX ORDER — LAMOTRIGINE 100 MG/1
TABLET ORAL
Qty: 105 TABLET | Refills: 0 | Status: SHIPPED | OUTPATIENT
Start: 2024-11-08

## 2024-12-06 DIAGNOSIS — F31.81 BIPOLAR II DISORDER: Chronic | ICD-10-CM

## 2024-12-06 DIAGNOSIS — F41.8 ANXIETY ASSOCIATED WITH DEPRESSION: Chronic | ICD-10-CM

## 2024-12-09 NOTE — TELEPHONE ENCOUNTER
Rx Refill Note  Requested Prescriptions     Pending Prescriptions Disp Refills    lamoTRIgine (LaMICtal) 100 MG tablet [Pharmacy Med Name: LAMOTRIGINE 100 MG TABLET 100 Tablet] 105 tablet 10     Sig: TAKE 2 TABLETS BY MOUTH EACH MORNING AND 1 & 1/2 TABLETS IN THE EVENING      Last office visit with prescribing clinician: 9/5/2024     Next office visit with prescribing clinician: 6/17/2025     Office Visit with Michaela Santoyo, LIO, APRN (09/05/2024)     Med check - 6-    Yasmin Wray MA  12/09/24, 08:45 EST

## 2024-12-11 RX ORDER — LAMOTRIGINE 100 MG/1
100 TABLET ORAL DAILY
Qty: 90 TABLET | Refills: 3 | Status: SHIPPED | OUTPATIENT
Start: 2024-12-11

## 2024-12-11 NOTE — TELEPHONE ENCOUNTER
Patient reviewed medication bottle and states she is taking Lamotrigine 100 mg which is working really well.

## 2025-03-05 ENCOUNTER — APPOINTMENT (OUTPATIENT)
Dept: GENERAL RADIOLOGY | Facility: HOSPITAL | Age: 60
End: 2025-03-05
Payer: COMMERCIAL

## 2025-03-05 ENCOUNTER — APPOINTMENT (OUTPATIENT)
Dept: CT IMAGING | Facility: HOSPITAL | Age: 60
End: 2025-03-05
Payer: COMMERCIAL

## 2025-03-05 ENCOUNTER — HOSPITAL ENCOUNTER (OUTPATIENT)
Facility: HOSPITAL | Age: 60
Setting detail: OBSERVATION
Discharge: HOME | End: 2025-03-06
Attending: EMERGENCY MEDICINE | Admitting: FAMILY MEDICINE
Payer: COMMERCIAL

## 2025-03-05 DIAGNOSIS — S20.219A CONTUSION OF CHEST WALL, UNSPECIFIED LATERALITY, INITIAL ENCOUNTER: ICD-10-CM

## 2025-03-05 DIAGNOSIS — S82.55XA NONDISPLACED FRACTURE OF MEDIAL MALLEOLUS OF LEFT TIBIA, INITIAL ENCOUNTER FOR CLOSED FRACTURE: ICD-10-CM

## 2025-03-05 DIAGNOSIS — S30.1XXA CONTUSION OF ABDOMINAL WALL, INITIAL ENCOUNTER: ICD-10-CM

## 2025-03-05 DIAGNOSIS — S80.02XA CONTUSION OF LEFT KNEE, INITIAL ENCOUNTER: Primary | ICD-10-CM

## 2025-03-05 PROBLEM — S82.899A ANKLE FRACTURE: Status: ACTIVE | Noted: 2025-03-05

## 2025-03-05 LAB
ALBUMIN SERPL-MCNC: 4.4 G/DL (ref 3.5–5.2)
ALBUMIN/GLOB SERPL: 2 G/DL
ALP SERPL-CCNC: 56 U/L (ref 39–117)
ALT SERPL W P-5'-P-CCNC: 45 U/L (ref 1–33)
ANION GAP SERPL CALCULATED.3IONS-SCNC: 21.7 MMOL/L (ref 5–15)
AST SERPL-CCNC: 30 U/L (ref 1–32)
BASOPHILS # BLD AUTO: 0.1 10*3/MM3 (ref 0–0.2)
BASOPHILS NFR BLD AUTO: 0.4 % (ref 0–1.5)
BILIRUB SERPL-MCNC: 0.3 MG/DL (ref 0–1.2)
BUN SERPL-MCNC: 5 MG/DL (ref 6–20)
BUN/CREAT SERPL: 11.6 (ref 7–25)
CALCIUM SPEC-SCNC: 9.3 MG/DL (ref 8.6–10.5)
CHLORIDE SERPL-SCNC: 94 MMOL/L (ref 98–107)
CO2 SERPL-SCNC: 17.3 MMOL/L (ref 22–29)
CREAT SERPL-MCNC: 0.43 MG/DL (ref 0.57–1)
DEPRECATED RDW RBC AUTO: 40.8 FL (ref 37–54)
EGFRCR SERPLBLD CKD-EPI 2021: 113.6 ML/MIN/1.73
EOSINOPHIL # BLD AUTO: 0.08 10*3/MM3 (ref 0–0.4)
EOSINOPHIL NFR BLD AUTO: 0.3 % (ref 0.3–6.2)
ERYTHROCYTE [DISTWIDTH] IN BLOOD BY AUTOMATED COUNT: 12.3 % (ref 12.3–15.4)
GLOBULIN UR ELPH-MCNC: 2.2 GM/DL
GLUCOSE SERPL-MCNC: 146 MG/DL (ref 65–99)
HCT VFR BLD AUTO: 44.9 % (ref 34–46.6)
HGB BLD-MCNC: 15.9 G/DL (ref 12–15.9)
HOLD SPECIMEN: NORMAL
IMM GRANULOCYTES # BLD AUTO: 0.2 10*3/MM3 (ref 0–0.05)
IMM GRANULOCYTES NFR BLD AUTO: 0.8 % (ref 0–0.5)
LYMPHOCYTES # BLD AUTO: 2.55 10*3/MM3 (ref 0.7–3.1)
LYMPHOCYTES NFR BLD AUTO: 9.8 % (ref 19.6–45.3)
MAGNESIUM SERPL-MCNC: 1.7 MG/DL (ref 1.6–2.6)
MCH RBC QN AUTO: 32.1 PG (ref 26.6–33)
MCHC RBC AUTO-ENTMCNC: 35.4 G/DL (ref 31.5–35.7)
MCV RBC AUTO: 90.7 FL (ref 79–97)
MONOCYTES # BLD AUTO: 1.07 10*3/MM3 (ref 0.1–0.9)
MONOCYTES NFR BLD AUTO: 4.1 % (ref 5–12)
NEUTROPHILS NFR BLD AUTO: 21.95 10*3/MM3 (ref 1.7–7)
NEUTROPHILS NFR BLD AUTO: 84.6 % (ref 42.7–76)
NRBC BLD AUTO-RTO: 0 /100 WBC (ref 0–0.2)
PLATELET # BLD AUTO: 284 10*3/MM3 (ref 140–450)
PMV BLD AUTO: 9.2 FL (ref 6–12)
POTASSIUM SERPL-SCNC: 3 MMOL/L (ref 3.5–5.2)
PROT SERPL-MCNC: 6.6 G/DL (ref 6–8.5)
RBC # BLD AUTO: 4.95 10*6/MM3 (ref 3.77–5.28)
SODIUM SERPL-SCNC: 133 MMOL/L (ref 136–145)
WBC NRBC COR # BLD AUTO: 25.95 10*3/MM3 (ref 3.4–10.8)
WHOLE BLOOD HOLD COAG: NORMAL

## 2025-03-05 PROCEDURE — 80053 COMPREHEN METABOLIC PANEL: CPT | Performed by: EMERGENCY MEDICINE

## 2025-03-05 PROCEDURE — 71045 X-RAY EXAM CHEST 1 VIEW: CPT

## 2025-03-05 PROCEDURE — 99285 EMERGENCY DEPT VISIT HI MDM: CPT

## 2025-03-05 PROCEDURE — G0378 HOSPITAL OBSERVATION PER HR: HCPCS

## 2025-03-05 PROCEDURE — 83735 ASSAY OF MAGNESIUM: CPT | Performed by: EMERGENCY MEDICINE

## 2025-03-05 PROCEDURE — 85025 COMPLETE CBC W/AUTO DIFF WBC: CPT | Performed by: EMERGENCY MEDICINE

## 2025-03-05 PROCEDURE — 96374 THER/PROPH/DIAG INJ IV PUSH: CPT

## 2025-03-05 PROCEDURE — 71260 CT THORAX DX C+: CPT

## 2025-03-05 PROCEDURE — 73610 X-RAY EXAM OF ANKLE: CPT

## 2025-03-05 PROCEDURE — 25010000002 HYDROMORPHONE 1 MG/ML SOLUTION: Performed by: EMERGENCY MEDICINE

## 2025-03-05 PROCEDURE — 36415 COLL VENOUS BLD VENIPUNCTURE: CPT

## 2025-03-05 PROCEDURE — 73590 X-RAY EXAM OF LOWER LEG: CPT

## 2025-03-05 PROCEDURE — 73706 CT ANGIO LWR EXTR W/O&W/DYE: CPT

## 2025-03-05 PROCEDURE — 74177 CT ABD & PELVIS W/CONTRAST: CPT

## 2025-03-05 PROCEDURE — 25810000003 LACTATED RINGERS PER 1000 ML: Performed by: EMERGENCY MEDICINE

## 2025-03-05 PROCEDURE — 93005 ELECTROCARDIOGRAM TRACING: CPT | Performed by: EMERGENCY MEDICINE

## 2025-03-05 PROCEDURE — 96361 HYDRATE IV INFUSION ADD-ON: CPT

## 2025-03-05 PROCEDURE — 25510000001 IOPAMIDOL PER 1 ML: Performed by: EMERGENCY MEDICINE

## 2025-03-05 PROCEDURE — 73562 X-RAY EXAM OF KNEE 3: CPT

## 2025-03-05 RX ORDER — ONDANSETRON 2 MG/ML
4 INJECTION INTRAMUSCULAR; INTRAVENOUS EVERY 6 HOURS PRN
Status: DISCONTINUED | OUTPATIENT
Start: 2025-03-05 | End: 2025-03-06 | Stop reason: HOSPADM

## 2025-03-05 RX ORDER — AMOXICILLIN 250 MG
2 CAPSULE ORAL 2 TIMES DAILY
Status: DISCONTINUED | OUTPATIENT
Start: 2025-03-06 | End: 2025-03-06 | Stop reason: HOSPADM

## 2025-03-05 RX ORDER — ACETAMINOPHEN 650 MG/1
650 SUPPOSITORY RECTAL EVERY 4 HOURS PRN
Status: DISCONTINUED | OUTPATIENT
Start: 2025-03-05 | End: 2025-03-06 | Stop reason: HOSPADM

## 2025-03-05 RX ORDER — ACETAMINOPHEN 160 MG/5ML
650 SOLUTION ORAL EVERY 4 HOURS PRN
Status: DISCONTINUED | OUTPATIENT
Start: 2025-03-05 | End: 2025-03-06 | Stop reason: HOSPADM

## 2025-03-05 RX ORDER — CALCIUM CARBONATE 500 MG/1
2 TABLET, CHEWABLE ORAL 2 TIMES DAILY PRN
Status: DISCONTINUED | OUTPATIENT
Start: 2025-03-05 | End: 2025-03-06 | Stop reason: HOSPADM

## 2025-03-05 RX ORDER — POTASSIUM CHLORIDE 1500 MG/1
40 TABLET, EXTENDED RELEASE ORAL ONCE
Status: COMPLETED | OUTPATIENT
Start: 2025-03-05 | End: 2025-03-05

## 2025-03-05 RX ORDER — BISACODYL 5 MG/1
5 TABLET, DELAYED RELEASE ORAL DAILY PRN
Status: DISCONTINUED | OUTPATIENT
Start: 2025-03-05 | End: 2025-03-06 | Stop reason: HOSPADM

## 2025-03-05 RX ORDER — IOPAMIDOL 755 MG/ML
100 INJECTION, SOLUTION INTRAVASCULAR
Status: COMPLETED | OUTPATIENT
Start: 2025-03-05 | End: 2025-03-05

## 2025-03-05 RX ORDER — HYDROMORPHONE HYDROCHLORIDE 1 MG/ML
0.5 INJECTION, SOLUTION INTRAMUSCULAR; INTRAVENOUS; SUBCUTANEOUS
Status: DISCONTINUED | OUTPATIENT
Start: 2025-03-05 | End: 2025-03-06 | Stop reason: HOSPADM

## 2025-03-05 RX ORDER — SODIUM CHLORIDE, SODIUM LACTATE, POTASSIUM CHLORIDE, CALCIUM CHLORIDE 600; 310; 30; 20 MG/100ML; MG/100ML; MG/100ML; MG/100ML
125 INJECTION, SOLUTION INTRAVENOUS CONTINUOUS
Status: DISCONTINUED | OUTPATIENT
Start: 2025-03-05 | End: 2025-03-06 | Stop reason: HOSPADM

## 2025-03-05 RX ORDER — SODIUM CHLORIDE 0.9 % (FLUSH) 0.9 %
10 SYRINGE (ML) INJECTION AS NEEDED
Status: DISCONTINUED | OUTPATIENT
Start: 2025-03-05 | End: 2025-03-06 | Stop reason: HOSPADM

## 2025-03-05 RX ORDER — SODIUM CHLORIDE 9 MG/ML
40 INJECTION, SOLUTION INTRAVENOUS AS NEEDED
Status: DISCONTINUED | OUTPATIENT
Start: 2025-03-05 | End: 2025-03-06 | Stop reason: HOSPADM

## 2025-03-05 RX ORDER — OXYCODONE HYDROCHLORIDE 5 MG/1
5 TABLET ORAL EVERY 4 HOURS PRN
Status: DISCONTINUED | OUTPATIENT
Start: 2025-03-05 | End: 2025-03-06 | Stop reason: HOSPADM

## 2025-03-05 RX ORDER — SODIUM CHLORIDE 0.9 % (FLUSH) 0.9 %
10 SYRINGE (ML) INJECTION EVERY 12 HOURS SCHEDULED
Status: DISCONTINUED | OUTPATIENT
Start: 2025-03-05 | End: 2025-03-06 | Stop reason: HOSPADM

## 2025-03-05 RX ORDER — POLYETHYLENE GLYCOL 3350 17 G/17G
17 POWDER, FOR SOLUTION ORAL DAILY PRN
Status: DISCONTINUED | OUTPATIENT
Start: 2025-03-05 | End: 2025-03-06 | Stop reason: HOSPADM

## 2025-03-05 RX ORDER — ACETAMINOPHEN 325 MG/1
650 TABLET ORAL EVERY 4 HOURS PRN
Status: DISCONTINUED | OUTPATIENT
Start: 2025-03-05 | End: 2025-03-06 | Stop reason: HOSPADM

## 2025-03-05 RX ORDER — NALOXONE HCL 0.4 MG/ML
0.4 VIAL (ML) INJECTION
Status: DISCONTINUED | OUTPATIENT
Start: 2025-03-05 | End: 2025-03-06 | Stop reason: HOSPADM

## 2025-03-05 RX ORDER — BISACODYL 10 MG
10 SUPPOSITORY, RECTAL RECTAL DAILY PRN
Status: DISCONTINUED | OUTPATIENT
Start: 2025-03-05 | End: 2025-03-06 | Stop reason: HOSPADM

## 2025-03-05 RX ADMIN — IOPAMIDOL 100 ML: 755 INJECTION, SOLUTION INTRAVENOUS at 21:25

## 2025-03-05 RX ADMIN — OXYCODONE 5 MG: 5 TABLET ORAL at 23:44

## 2025-03-05 RX ADMIN — HYDROMORPHONE HYDROCHLORIDE 0.5 MG: 1 INJECTION, SOLUTION INTRAMUSCULAR; INTRAVENOUS; SUBCUTANEOUS at 22:45

## 2025-03-05 RX ADMIN — Medication 10 ML: at 23:44

## 2025-03-05 RX ADMIN — POTASSIUM CHLORIDE 40 MEQ: 1500 TABLET, EXTENDED RELEASE ORAL at 23:44

## 2025-03-05 RX ADMIN — IOPAMIDOL 100 ML: 755 INJECTION, SOLUTION INTRAVENOUS at 21:43

## 2025-03-05 RX ADMIN — SODIUM CHLORIDE, SODIUM LACTATE, POTASSIUM CHLORIDE, AND CALCIUM CHLORIDE 125 ML/HR: 600; 310; 30; 20 INJECTION, SOLUTION INTRAVENOUS at 22:48

## 2025-03-05 NOTE — LETTER
EMS Transport Request  For use at [facility] [place] [place], and [place] only   Patient Name: Evangelina Levine : 1965   Weight:92.3 kg (203 lb 7.8 oz) Pick-up Location: 2025 BLS/ALS: BLS/ALS: BLS   Insurance: [employer] Auth End Date: NA   Pre-Cert #: NA D/C Summary complete: Yes   Destination: [address] [place] [address] [place] [id number]    Contact Precautions: None   Equipment (O2, Fluids, etc.): Other RW   Arrive By Date/Time: 2025 Stretcher/WC: Wheelchair   CM Requesting: Ana Valdez Ext: 1984   Notes/Medical Necessity: Needs WC van/No O2/can sit and pivot/has LLE knee brace. Waiting on RW to be delivered to pt.      ______________________________________________________________________    *Only 2 patient bags OR 1 carry-on size bag are permitted.  Wheelchairs and walkers CANNOT transported with the patient. Acknowledge: Yes

## 2025-03-06 VITALS
WEIGHT: 203.48 LBS | OXYGEN SATURATION: 100 % | TEMPERATURE: 98.7 F | BODY MASS INDEX: 34.74 KG/M2 | DIASTOLIC BLOOD PRESSURE: 70 MMHG | HEART RATE: 70 BPM | HEIGHT: 64 IN | SYSTOLIC BLOOD PRESSURE: 140 MMHG | RESPIRATION RATE: 16 BRPM

## 2025-03-06 LAB
ANION GAP SERPL CALCULATED.3IONS-SCNC: 14.1 MMOL/L (ref 5–15)
BUN SERPL-MCNC: 5 MG/DL (ref 6–20)
BUN/CREAT SERPL: 11.1 (ref 7–25)
CALCIUM SPEC-SCNC: 8.9 MG/DL (ref 8.6–10.5)
CHLORIDE SERPL-SCNC: 97 MMOL/L (ref 98–107)
CO2 SERPL-SCNC: 22.9 MMOL/L (ref 22–29)
CREAT SERPL-MCNC: 0.45 MG/DL (ref 0.57–1)
DEPRECATED RDW RBC AUTO: 41.6 FL (ref 37–54)
EGFRCR SERPLBLD CKD-EPI 2021: 112.4 ML/MIN/1.73
ERYTHROCYTE [DISTWIDTH] IN BLOOD BY AUTOMATED COUNT: 12.6 % (ref 12.3–15.4)
GLUCOSE SERPL-MCNC: 150 MG/DL (ref 65–99)
HCT VFR BLD AUTO: 40 % (ref 34–46.6)
HGB BLD-MCNC: 13.8 G/DL (ref 12–15.9)
MCH RBC QN AUTO: 31.5 PG (ref 26.6–33)
MCHC RBC AUTO-ENTMCNC: 34.5 G/DL (ref 31.5–35.7)
MCV RBC AUTO: 91.3 FL (ref 79–97)
PLATELET # BLD AUTO: 227 10*3/MM3 (ref 140–450)
PMV BLD AUTO: 9.1 FL (ref 6–12)
POTASSIUM SERPL-SCNC: 3.3 MMOL/L (ref 3.5–5.2)
QT INTERVAL: 435 MS
QTC INTERVAL: 520 MS
RBC # BLD AUTO: 4.38 10*6/MM3 (ref 3.77–5.28)
SODIUM SERPL-SCNC: 134 MMOL/L (ref 136–145)
WBC NRBC COR # BLD AUTO: 12.95 10*3/MM3 (ref 3.4–10.8)

## 2025-03-06 PROCEDURE — 96375 TX/PRO/DX INJ NEW DRUG ADDON: CPT

## 2025-03-06 PROCEDURE — 96376 TX/PRO/DX INJ SAME DRUG ADON: CPT

## 2025-03-06 PROCEDURE — 25010000002 HYDROMORPHONE PER 4 MG

## 2025-03-06 PROCEDURE — 80048 BASIC METABOLIC PNL TOTAL CA: CPT

## 2025-03-06 PROCEDURE — 85027 COMPLETE CBC AUTOMATED: CPT

## 2025-03-06 PROCEDURE — G0378 HOSPITAL OBSERVATION PER HR: HCPCS

## 2025-03-06 PROCEDURE — 97161 PT EVAL LOW COMPLEX 20 MIN: CPT | Performed by: PHYSICAL THERAPIST

## 2025-03-06 PROCEDURE — 96361 HYDRATE IV INFUSION ADD-ON: CPT

## 2025-03-06 PROCEDURE — 25010000002 ONDANSETRON PER 1 MG

## 2025-03-06 RX ORDER — CYCLOBENZAPRINE HCL 10 MG
10 TABLET ORAL 3 TIMES DAILY
Status: DISCONTINUED | OUTPATIENT
Start: 2025-03-06 | End: 2025-03-06 | Stop reason: HOSPADM

## 2025-03-06 RX ORDER — METOCLOPRAMIDE 10 MG/1
20 TABLET ORAL EVERY EVENING
Status: DISCONTINUED | OUTPATIENT
Start: 2025-03-06 | End: 2025-03-06 | Stop reason: HOSPADM

## 2025-03-06 RX ORDER — FUROSEMIDE 40 MG/1
20 TABLET ORAL DAILY
Status: DISCONTINUED | OUTPATIENT
Start: 2025-03-06 | End: 2025-03-06 | Stop reason: HOSPADM

## 2025-03-06 RX ORDER — ATENOLOL 50 MG/1
100 TABLET ORAL
Status: DISCONTINUED | OUTPATIENT
Start: 2025-03-06 | End: 2025-03-06 | Stop reason: HOSPADM

## 2025-03-06 RX ORDER — METFORMIN HYDROCHLORIDE 500 MG/1
500 TABLET, EXTENDED RELEASE ORAL
Status: DISCONTINUED | OUTPATIENT
Start: 2025-03-06 | End: 2025-03-06 | Stop reason: HOSPADM

## 2025-03-06 RX ORDER — ATORVASTATIN CALCIUM 40 MG/1
40 TABLET, FILM COATED ORAL DAILY
Status: DISCONTINUED | OUTPATIENT
Start: 2025-03-06 | End: 2025-03-06 | Stop reason: HOSPADM

## 2025-03-06 RX ORDER — HYDROCODONE BITARTRATE AND ACETAMINOPHEN 5; 325 MG/1; MG/1
1 TABLET ORAL EVERY 6 HOURS PRN
Qty: 21 TABLET | Refills: 0 | Status: SHIPPED | OUTPATIENT
Start: 2025-03-06

## 2025-03-06 RX ORDER — CHLORTHALIDONE 25 MG/1
25 TABLET ORAL
Status: DISCONTINUED | OUTPATIENT
Start: 2025-03-06 | End: 2025-03-06 | Stop reason: HOSPADM

## 2025-03-06 RX ORDER — CYCLOBENZAPRINE HCL 10 MG
10 TABLET ORAL 3 TIMES DAILY
Qty: 30 TABLET | Refills: 0 | Status: SHIPPED | OUTPATIENT
Start: 2025-03-06

## 2025-03-06 RX ORDER — POTASSIUM CHLORIDE 1500 MG/1
40 TABLET, EXTENDED RELEASE ORAL EVERY 4 HOURS
Status: COMPLETED | OUTPATIENT
Start: 2025-03-06 | End: 2025-03-06

## 2025-03-06 RX ORDER — TRAZODONE HYDROCHLORIDE 50 MG/1
200 TABLET ORAL NIGHTLY PRN
Status: DISCONTINUED | OUTPATIENT
Start: 2025-03-06 | End: 2025-03-06 | Stop reason: HOSPADM

## 2025-03-06 RX ORDER — ESCITALOPRAM OXALATE 10 MG/1
20 TABLET ORAL DAILY
Status: DISCONTINUED | OUTPATIENT
Start: 2025-03-06 | End: 2025-03-06 | Stop reason: HOSPADM

## 2025-03-06 RX ORDER — METFORMIN HYDROCHLORIDE 500 MG/1
500 TABLET, EXTENDED RELEASE ORAL
COMMUNITY

## 2025-03-06 RX ORDER — PANTOPRAZOLE SODIUM 40 MG/1
40 TABLET, DELAYED RELEASE ORAL
Status: DISCONTINUED | OUTPATIENT
Start: 2025-03-06 | End: 2025-03-06 | Stop reason: HOSPADM

## 2025-03-06 RX ADMIN — FUROSEMIDE 20 MG: 40 TABLET ORAL at 09:03

## 2025-03-06 RX ADMIN — POTASSIUM CHLORIDE 40 MEQ: 1500 TABLET, EXTENDED RELEASE ORAL at 06:14

## 2025-03-06 RX ADMIN — METFORMIN HYDROCHLORIDE 500 MG: 500 TABLET, EXTENDED RELEASE ORAL at 09:10

## 2025-03-06 RX ADMIN — OXYCODONE 5 MG: 5 TABLET ORAL at 06:18

## 2025-03-06 RX ADMIN — ATORVASTATIN CALCIUM 40 MG: 40 TABLET, FILM COATED ORAL at 09:03

## 2025-03-06 RX ADMIN — ONDANSETRON 4 MG: 2 INJECTION, SOLUTION INTRAMUSCULAR; INTRAVENOUS at 09:09

## 2025-03-06 RX ADMIN — HYDROMORPHONE HYDROCHLORIDE 0.5 MG: 1 INJECTION, SOLUTION INTRAMUSCULAR; INTRAVENOUS; SUBCUTANEOUS at 12:55

## 2025-03-06 RX ADMIN — ESCITALOPRAM 20 MG: 10 TABLET, FILM COATED ORAL at 09:03

## 2025-03-06 RX ADMIN — OXYCODONE 5 MG: 5 TABLET ORAL at 10:31

## 2025-03-06 RX ADMIN — CYCLOBENZAPRINE HYDROCHLORIDE 10 MG: 10 TABLET, FILM COATED ORAL at 15:34

## 2025-03-06 RX ADMIN — HYDROMORPHONE HYDROCHLORIDE 0.5 MG: 1 INJECTION, SOLUTION INTRAMUSCULAR; INTRAVENOUS; SUBCUTANEOUS at 01:37

## 2025-03-06 RX ADMIN — SENNOSIDES AND DOCUSATE SODIUM: 50; 8.6 TABLET ORAL at 09:03

## 2025-03-06 RX ADMIN — POTASSIUM CHLORIDE 40 MEQ: 1500 TABLET, EXTENDED RELEASE ORAL at 09:03

## 2025-03-06 RX ADMIN — HYDROMORPHONE HYDROCHLORIDE 0.5 MG: 1 INJECTION, SOLUTION INTRAMUSCULAR; INTRAVENOUS; SUBCUTANEOUS at 09:01

## 2025-03-06 RX ADMIN — ATENOLOL 100 MG: 50 TABLET ORAL at 09:03

## 2025-03-06 RX ADMIN — OXYCODONE 5 MG: 5 TABLET ORAL at 15:34

## 2025-03-06 RX ADMIN — CYCLOBENZAPRINE HYDROCHLORIDE 10 MG: 10 TABLET, FILM COATED ORAL at 10:31

## 2025-03-06 RX ADMIN — CHLORTHALIDONE 25 MG: 25 TABLET ORAL at 09:03

## 2025-03-06 RX ADMIN — PANTOPRAZOLE SODIUM 40 MG: 40 TABLET, DELAYED RELEASE ORAL at 06:14
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
## 2025-03-06 NOTE — CASE MANAGEMENT/SOCIAL WORK
Continued Stay Note   Agustin     Patient Name: Evangelina Levine  MRN: [id number]  Today's Date: 3/6/2025    Admit Date: 3/5/2025    Plan: DC Home   Discharge Plan       Row Name 03/06/25 1101       Plan    Plan DC Home    Plan Comments CM spoke to pt. At bedside. PT recommends home with RW. Pt. Preferred [facility]. CM called [facility] and a RW can be delivered. On 3/7/2025. Pt. Notified and prefers [facility] for RW. CM notified liaison [place]. RW to be delivered to pt. Today. WC van for transport home.                   Expected Discharge Date and Time       Expected Discharge Date Expected Discharge Time    Mar 6, 2025               Chloé Valdez RN    Office: [phone]  Fax: [phone]  [email or web address]
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
## 2025-03-06 NOTE — PLAN OF CARE
Problem: Pain Acute  Goal: Optimal Pain Control and Function  Outcome: Progressing     Problem: Adult Inpatient Plan of Care  Goal: Plan of Care Review  Outcome: Progressing  Goal: Patient-Specific Goal (Individualized)  Outcome: Progressing  Goal: Absence of Hospital-Acquired Illness or Injury  Outcome: Progressing  Intervention: Identify and Manage Fall Risk  Recent Flowsheet Documentation  Taken 3/6/2025 1000 by Jane Pugh RN  Safety Promotion/Fall Prevention: safety round/check completed  Taken 3/6/2025 0800 by Jane Pugh RN  Safety Promotion/Fall Prevention: safety round/check completed  Intervention: Prevent Skin Injury  Recent Flowsheet Documentation  Taken 3/6/2025 1000 by Jane Pugh RN  Body Position: position changed independently  Taken 3/6/2025 0800 by Jane Pugh RN  Body Position: position changed independently  Goal: Optimal Comfort and Wellbeing  Outcome: Progressing  Intervention: Provide Person-Centered Care  Recent Flowsheet Documentation  Taken 3/6/2025 0800 by Jane Pugh RN  Trust Relationship/Rapport:   care explained   choices provided   emotional support provided   empathic listening provided   questions answered   reassurance provided   questions encouraged   thoughts/feelings acknowledged  Goal: Readiness for Transition of Care  Outcome: Progressing

## 2025-03-06 NOTE — DISCHARGE PLACEMENT REQUEST
"Som Cardenas \"Arelis\" (59 y.o. Female)       Date of Birth   1965    Social Security Number       Address   [address] [address] [place]    Home Phone   [phone]    MRN   [id number]       Anabaptist   Lutheran    Marital Status                               Admission Date   3/5/25    Admission Type   Emergency    Admitting Provider   Pool Whitaker DO    Attending Provider   Antonio Cooper MD    Department, Room/Bed   [facility] EMERGENCY DEPARTMENT, 05/05       Discharge Date       Discharge Disposition       Discharge Destination                                 Attending Provider: Antonio Cooper MD    Allergies: Sulfa Antibiotics    Isolation: None   Infection: None   Code Status: CPR    Ht: 162.6 cm (64\")   Wt: 92.3 kg (203 lb 7.8 oz)    Admission Cmt: None   Principal Problem: Ankle fracture [S82.899A]                   Active Insurance as of 3/5/2025       Primary Coverage       Payor Plan Insurance Group Employer/Plan Group    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 [facility]       Payor Plan Address Payor Plan Phone Number Payor Plan Fax Number Effective Dates    [address] [phone]  3/5/2025 - None Entered    [place] [address]         Subscriber Name Subscriber Birth Date Member ID       SOM CARDENAS 1965 [id number]               Secondary Coverage       Payor Plan Insurance Group Employer/Plan Group    ANTHEM MEDICARE REPLACEMENT ANTHEM MEDICARE ADVANTAGE HMO [id number]       Payor Plan Address Payor Plan Phone Number Payor Plan Fax Number Effective Dates    [address] [phone]  1/1/2025 - None Entered    [facility] [address]         Subscriber Name Subscriber Birth Date Member ID       SOM CARDENAS 1965 [id number]                     Emergency Contacts        (Rel.) Home Phone Work Phone Mobile Phone    Alfredo Cardenas (Spouse) -- -- [phone]                "

## 2025-03-06 NOTE — CASE MANAGEMENT/SOCIAL WORK
Continued Stay Note  RIAZ Velez     Patient Name: Evangelina Levine  MRN: [id number]  Today's Date: 3/6/2025    Admit Date: 3/5/2025    Plan: DC Home   Discharge Plan       Row Name 03/06/25 1101       Plan    Plan DC Home    Plan Comments Noted pt with dc order. Will need RW for dc. SC sent to Dr. Cooper to request order. New order rec'd for outpatient RW and entered into Epic.                   Discharge Codes    No documentation.                 Expected Discharge Date and Time       Expected Discharge Date Expected Discharge Time    Mar 6, 2025         Josselin Snyder RN    Phone [phone]  Fax [phone]
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
## 2025-03-06 NOTE — PLAN OF CARE
Problem: Pain Acute  Goal: Optimal Pain Control and Function  Outcome: Progressing  Intervention: Optimize Psychosocial Wellbeing  Description: Facilitate patient's self-control over pain by providing pain information and allowing choices in treatment.  Consider and address emotional response to pain; express compassion and reassurance.  Explore and promote use of coping strategies; address barriers to successful coping.  Evaluate and assist with psychosocial, cultural and spiritual factors impacting pain.  Assess for risk factors for developing chronic pain, such as depression, fear, pain avoidance and pain catastrophizing.  Modify pain perception using techniques, such as distraction, mindfulness, guided imagery, meditation or music.  Consider referral for ongoing coping support, such as education, relaxation training and role of thoughts.  Recent Flowsheet Documentation  Taken 3/5/2025 2344 by Dori Pat RN  Diversional Activities: television  Intervention: Develop Pain Management Plan  Description: Acknowledge patient as the expert in pain self-management.  Use a consistent, validated tool for pain assessment; include function and quality of life.  Evaluate risk for opioid use and dependence.  Set pain management goals; determine acceptable level of discomfort to allow for maximal functioning.  Determine mutually agreed-upon pain management plan, including both pharmacologic and nonpharmacologic measures; integrate management of chronic (persistent) pain.  Identify and integrate past successful treatment measures, if able.  Reevaluate plan regularly.  Recent Flowsheet Documentation  Taken 3/5/2025 2344 by Dori Pat RN  Pain Management Interventions: pain medication given     Problem: Adult Inpatient Plan of Care  Goal: Plan of Care Review  Outcome: Progressing  Goal: Patient-Specific Goal (Individualized)  Outcome: Progressing  Goal: Absence of Hospital-Acquired Illness or Injury  Outcome:  Progressing  Intervention: Identify and Manage Fall Risk  Description: Perform standard risk assessment on admission using a validated tool or comprehensive approach appropriate to the patient; reassess fall risk frequently, with change in status or transfer to another level of care.  Communicate risk to interprofessional healthcare team; ensure fall risk visible cue.  Determine need for increased observation, equipment and environmental modification, as well as use of supportive, nonskid footwear.  Adjust safety measures to individual needs and identified risk factors.  Reinforce the importance of active participation with fall risk prevention, safety, and physical activity with the patient and family.  Perform regular intentional rounding to assess need for position change, pain assessment and personal needs, including assistance with toileting.  Recent Flowsheet Documentation  Taken 3/6/2025 0000 by Dori Pat RN  Safety Promotion/Fall Prevention:   fall prevention program maintained   clutter free environment maintained   activity supervised  Intervention: Prevent Skin Injury  Description: Perform a screening for skin injury risk, such as pressure or moisture-associated skin damage on admission and at regular intervals throughout hospital stay.  Keep all areas of skin (especially folds) clean and dry.  Maintain adequate skin hydration.  Relieve and redistribute pressure and protect bony prominences and skin at risk for injury; implement measures based on patient-specific risk factors.  Match turning and repositioning schedule to clinical condition.  Encourage weight shift frequently; assist with reposition if unable to complete independently.  Float heels off bed; avoid pressure on the Achilles tendon.  Keep skin free from extended contact with medical devices.  Optimize nutrition and hydration.  Encourage functional activity and mobility, as early as tolerated.  Use aids (e.g., slide boards, mechanical  lift) during transfer.  Recent Flowsheet Documentation  Taken 3/5/2025 2344 by Dori Pat RN  Skin Protection: drying agents applied  Intervention: Prevent and Manage VTE (Venous Thromboembolism) Risk  Description: Assess for VTE (venous thromboembolism) risk.  Promote early mobilization; encourage both active and passive leg exercises, if unable to ambulate.  Initiate and maintain compression or other therapy, as indicated, based on identified risk in accordance with organizational protocol and provider order.  Recognize the patient's individual risk for bleeding before initiating pharmacologic thromboprophylaxis.  Recent Flowsheet Documentation  Taken 3/5/2025 2344 by Dori Pat RN  VTE Prevention/Management:   patient refused intervention   SCDs (sequential compression devices) off  Goal: Optimal Comfort and Wellbeing  Outcome: Progressing  Intervention: Monitor Pain and Promote Comfort  Description: Assess pain level, treatment efficacy and patient response at regular intervals using a consistent pain scale.  Consider the presence and impact of preexisting chronic pain.  Encourage patient and caregiver involvement in pain assessment, interventions and safety measures.  Promote activity; balance with sleep and rest to enhance healing.  Recent Flowsheet Documentation  Taken 3/5/2025 2344 by Dori Pat RN  Pain Management Interventions: pain medication given  Intervention: Provide Person-Centered Care  Description: Use a family-focused approach to care; encourage support system presence and participation.  Develop trust and rapport by proactively providing information, encouraging questions, addressing concerns and offering reassurance.  Acknowledge emotional response to hospitalization.  Recognize and utilize personal coping strategies and strengths; develop goals via shared decision-making.  Honor spiritual and cultural preferences.  Recent Flowsheet Documentation  Taken 3/5/2025 2344 by  Dori Pat RN  Trust Relationship/Rapport:   care explained   choices provided  Goal: Readiness for Transition of Care  Outcome: Progressing  Intervention: Mutually Develop Transition Plan  Description: Identify available resources for support (e.g., family, friends, community).  Identify and address barriers to ongoing treatment and home management (e.g., environmental, financial).  Provide opportunities to practice self-management skills.  Assess and monitor emotional readiness for transition.  Establish or reconnect linkage with outpatient providers or community-based services.  Recent Flowsheet Documentation  Taken 3/6/2025 0048 by Dori Pat RN  Equipment Currently Used at Home: none  Transportation Anticipated: family or friend will provide  Patient/Family Anticipated Services at Transition: none  Patient/Family Anticipates Transition to: home with family     Problem: Adult Inpatient Plan of Care  Goal: Readiness for Transition of Care  Outcome: Progressing  Intervention: Mutually Develop Transition Plan  Description: Identify available resources for support (e.g., family, friends, community).  Identify and address barriers to ongoing treatment and home management (e.g., environmental, financial).  Provide opportunities to practice self-management skills.  Assess and monitor emotional readiness for transition.  Establish or reconnect linkage with outpatient providers or community-based services.  Recent Flowsheet Documentation  Taken 3/6/2025 0048 by Dori Pat RN  Equipment Currently Used at Home: none  Transportation Anticipated: family or friend will provide  Patient/Family Anticipated Services at Transition: none  Patient/Family Anticipates Transition to: home with family     Problem: Adult Inpatient Plan of Care  Goal: Optimal Comfort and Wellbeing  Outcome: Progressing  Intervention: Monitor Pain and Promote Comfort  Description: Assess pain level, treatment efficacy and patient  response at regular intervals using a consistent pain scale.  Consider the presence and impact of preexisting chronic pain.  Encourage patient and caregiver involvement in pain assessment, interventions and safety measures.  Promote activity; balance with sleep and rest to enhance healing.  Recent Flowsheet Documentation  Taken 3/5/2025 2344 by Dori Pat, RN  Pain Management Interventions: pain medication given  Intervention: Provide Person-Centered Care  Description: Use a family-focused approach to care; encourage support system presence and participation.  Develop trust and rapport by proactively providing information, encouraging questions, addressing concerns and offering reassurance.  Acknowledge emotional response to hospitalization.  Recognize and utilize personal coping strategies and strengths; develop goals via shared decision-making.  Honor spiritual and cultural preferences.  Recent Flowsheet Documentation  Taken 3/5/2025 2344 by Dori Pat RN  Trust Relationship/Rapport:   care explained   choices provided   Goal Outcome Evaluation:                                             Abdomen soft, non-tender, no guarding.
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
## 2025-07-29 ENCOUNTER — OFFICE VISIT (OUTPATIENT)
Dept: PSYCHIATRY | Facility: CLINIC | Age: 60
End: 2025-07-29
Payer: MEDICARE

## 2025-07-29 VITALS
WEIGHT: 196 LBS | SYSTOLIC BLOOD PRESSURE: 136 MMHG | BODY MASS INDEX: 33.64 KG/M2 | HEART RATE: 66 BPM | DIASTOLIC BLOOD PRESSURE: 83 MMHG | OXYGEN SATURATION: 97 %

## 2025-07-29 DIAGNOSIS — F43.10 POST TRAUMATIC STRESS DISORDER (PTSD): Chronic | ICD-10-CM

## 2025-07-29 DIAGNOSIS — F31.81 BIPOLAR II DISORDER: Primary | Chronic | ICD-10-CM

## 2025-07-29 DIAGNOSIS — F41.8 ANXIETY ASSOCIATED WITH DEPRESSION: Chronic | ICD-10-CM

## 2025-07-29 PROCEDURE — 1159F MED LIST DOCD IN RCRD: CPT

## 2025-07-29 PROCEDURE — 96127 BRIEF EMOTIONAL/BEHAV ASSMT: CPT

## 2025-07-29 PROCEDURE — 99214 OFFICE O/P EST MOD 30 MIN: CPT

## 2025-07-29 PROCEDURE — 1160F RVW MEDS BY RX/DR IN RCRD: CPT
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
## 2025-08-08 RX ORDER — ESCITALOPRAM OXALATE 20 MG/1
20 TABLET ORAL DAILY
Qty: 30 TABLET | Refills: 11 | Status: SHIPPED | OUTPATIENT
Start: 2025-08-08

## (undated) DEVICE — DECANTER: Brand: UNBRANDED

## (undated) DEVICE — UNDYED BRAIDED (POLYGLACTIN 910), SYNTHETIC ABSORBABLE SUTURE: Brand: COATED VICRYL

## (undated) DEVICE — GLV SURG SENSICARE PI ORTHO SZ8.5 LF STRL

## (undated) DEVICE — SOL IRRIG H2O 1000ML STRL

## (undated) DEVICE — DRAPE SHEET ULTRAGARD: Brand: MEDLINE

## (undated) DEVICE — KT SURG TURNOVER 050

## (undated) DEVICE — DRAPE,SHOULDER,BEACH CHAIR,STERILE: Brand: MEDLINE

## (undated) DEVICE — APPL CHLORAPREP HI/LITE TINTED 10.5ML ORNG

## (undated) DEVICE — GLV SURG DERMASSURE GRN LF PF 8.5

## (undated) DEVICE — 9165 UNIVERSAL PATIENT PLATE: Brand: 3M™

## (undated) DEVICE — SKIN AFFIX SURG ADHESIVE 72/CS 0.55ML: Brand: MEDLINE

## (undated) DEVICE — KIRSCHNER WIRE
Type: IMPLANTABLE DEVICE | Site: HUMERUS | Status: NON-FUNCTIONAL
Removed: 2021-03-09

## (undated) DEVICE — BNDG ELAS ELITE V/CLOSE 4IN 5YD LF STRL

## (undated) DEVICE — SUT VIC 2/0 CT2 27IN J269H

## (undated) DEVICE — SUT MONOCRYL 4/0 PS2 27IN Y426H ETY426H

## (undated) DEVICE — OCCLUSIVE GAUZE STRIP OVERWRAP,3% BISMUTH TRIBROMOPHENATE IN PETROLATUM BLEND: Brand: XEROFORM

## (undated) DEVICE — DRSNG TELFA PAD NONADH STR 1S 3X8IN

## (undated) DEVICE — SOL IRRIG NACL 1000ML

## (undated) DEVICE — DRILL BIT NON-LOCKING, SHORT: Brand: AXSOS

## (undated) DEVICE — SPNG LAP PREWSH SFTPK 18X18IN STRL PK/5

## (undated) DEVICE — C-ARM: Brand: UNBRANDED

## (undated) DEVICE — 3M™ IOBAN™ 2 ANTIMICROBIAL INCISE DRAPE 6650EZ: Brand: IOBAN™ 2

## (undated) DEVICE — SLV SCD CALF HEMOFORCE DVT THERP REPROC MD

## (undated) DEVICE — PK EXTREM 50

## (undated) DEVICE — GOWN,REINFORCE,POLY,SIRUS,BREATH SLV,XLG: Brand: MEDLINE

## (undated) DEVICE — PROXIMATE RH ROTATING HEAD SKIN STAPLERS (35 WIDE) CONTAINS 35 STAINLESS STEEL STAPLES: Brand: PROXIMATE

## (undated) DEVICE — DRILL BIT LOCKING, SHORT: Brand: AXSOS

## (undated) DEVICE — PAD,ABDOMINAL,5"X9",STERILE,LF,1/PK: Brand: MEDLINE INDUSTRIES, INC.

## (undated) DEVICE — GAUZE,SPONGE,FLUFF,6"X6.75",STRL,5/TRAY: Brand: MEDLINE

## (undated) DEVICE — DRSNG SURESITE WNDW 4X4.5

## (undated) DEVICE — 3M™ STERI-DRAPE™ U-DRAPE 1015: Brand: STERI-DRAPE™

## (undated) DEVICE — PENCL EVAC ULTRAVAC SMOKE W/BLD